# Patient Record
Sex: FEMALE | Race: BLACK OR AFRICAN AMERICAN | Employment: FULL TIME
[De-identification: names, ages, dates, MRNs, and addresses within clinical notes are randomized per-mention and may not be internally consistent; named-entity substitution may affect disease eponyms.]

---

## 2019-10-24 ENCOUNTER — NURSE TRIAGE (OUTPATIENT)
Dept: OTHER | Facility: CLINIC | Age: 34
End: 2019-10-24

## 2019-11-07 ENCOUNTER — NURSE TRIAGE (OUTPATIENT)
Dept: OTHER | Facility: CLINIC | Age: 34
End: 2019-11-07

## 2021-02-16 ENCOUNTER — OFFICE VISIT (OUTPATIENT)
Dept: ORTHOPEDIC SURGERY | Age: 36
End: 2021-02-16
Payer: COMMERCIAL

## 2021-02-16 VITALS — TEMPERATURE: 97.4 F | BODY MASS INDEX: 30.73 KG/M2 | HEIGHT: 64 IN | WEIGHT: 180 LBS

## 2021-02-16 DIAGNOSIS — S83.241A ACUTE MEDIAL MENISCUS TEAR, RIGHT, INITIAL ENCOUNTER: Primary | ICD-10-CM

## 2021-02-16 PROCEDURE — 99203 OFFICE O/P NEW LOW 30 MIN: CPT | Performed by: ORTHOPAEDIC SURGERY

## 2021-02-16 PROCEDURE — E0114 CRUTCH UNDERARM PAIR NO WOOD: HCPCS | Performed by: ORTHOPAEDIC SURGERY

## 2021-02-16 RX ORDER — NORELGESTROMIN AND ETHINYL ESTRADIOL 150; 35 UG/D; UG/D
PATCH TRANSDERMAL
COMMUNITY
Start: 2020-12-15

## 2021-02-16 RX ORDER — ESCITALOPRAM OXALATE 5 MG/1
TABLET ORAL
COMMUNITY
Start: 2020-12-08

## 2021-02-16 SDOH — ECONOMIC STABILITY: TRANSPORTATION INSECURITY
IN THE PAST 12 MONTHS, HAS LACK OF TRANSPORTATION KEPT YOU FROM MEETINGS, WORK, OR FROM GETTING THINGS NEEDED FOR DAILY LIVING?: NOT ASKED

## 2021-02-16 SDOH — ECONOMIC STABILITY: FOOD INSECURITY: WITHIN THE PAST 12 MONTHS, YOU WORRIED THAT YOUR FOOD WOULD RUN OUT BEFORE YOU GOT MONEY TO BUY MORE.: NOT ASKED

## 2021-02-16 NOTE — PROGRESS NOTES
ORTHOPAEDIC NEW PATIENT NOTE    Chief Complaint   Patient presents with    Knee Pain     Right       HPI   2/16/2021  39 y.o. 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 Pain Assessment  Location of Pain: Knee  Location Modifiers: Right  Severity of Pain: 10  Quality of Pain: Other (Comment)(Stabbing)  Duration of Pain: Persistent  Frequency of Pain: Intermittent  Date Pain First Started: 02/11/21  Aggravating Factors: Walking, Other (Comment), Bending(Bumping it)  Limiting Behavior: Yes  Relieving Factors: Rest, Ice, Nsaids  Result of Injury: No  Work-Related Injury: No  Are there other pain locations you wish to document?: No      Review of Systems  I have read over the ROS from the Patient History Form dated on 2/16/2021  Pertinent positives include - none listed  Rest of 13 point ROS otherwise negative except per HPI, and scanned into the patient's chart under the Media tab. No Known Allergies     Current Outpatient Medications   Medication Sig Dispense Refill    XULANE 150-35 MCG/24HR UNWRAP AND APPLY 1 PATCH TO SKIN ONCE A WEEK      ibuprofen (ADVIL;MOTRIN) 600 MG tablet Take 1 tablet by mouth every 6 hours as needed for Pain (Patient taking differently: Take 800 mg by mouth every 6 hours as needed for Pain ) 40 tablet 0    escitalopram (LEXAPRO) 5 MG tablet       hydrocortisone (ANUSOL-HC) 2.5 % rectal cream Place rectally 2 times daily.  (Patient not taking: Reported on 2/16/2021) 1 Tube 0  sennosides-docusate sodium (SENOKOT-S) 8.6-50 MG tablet Take 1 tablet by mouth daily (Patient not taking: Reported on 2021) 60 tablet 0    hydrocodone-acetaminophen (VICODIN) 5-500 MG per tablet Take 1 tablet by mouth every 6 hours as needed.  ferrous sulfate 325 (65 FE) MG tablet Take 325 mg by mouth daily (with breakfast).  Ascorbic Acid (VITAMIN C) 500 MG tablet Take 500 mg by mouth daily. No current facility-administered medications for this visit. No past medical history on file. Past Surgical History:   Procedure Laterality Date     SECTION         No family history on file. Social History     Socioeconomic History    Marital status:      Spouse name: Not on file    Number of children: Not on file    Years of education: Not on file    Highest education level: Not on file   Occupational History    Occupation: Pharmacy Tech     Employer: [employer]     Comment: [employer]   Social Needs    Financial resource strain: Not on file    Food insecurity     Worry: Not on file     Inability: Not on file   [facility] needs     Medical: Not on file     Non-medical: Not on file   Tobacco Use    Smoking status: Never Smoker    Smokeless tobacco: Never Used   Substance and Sexual Activity    Alcohol use:  Yes     Alcohol/week: 1.7 standard drinks     Types: 2 Standard drinks or equivalent per week    Drug use: No    Sexual activity: Not on file   Lifestyle    Physical activity     Days per week: Not on file     Minutes per session: Not on file    Stress: Not on file   Relationships    Social connections     Talks on phone: Not on file     Gets together: Not on file     Attends Sikh service: Not on file     Active member of club or organization: Not on file     Attends meetings of clubs or organizations: Not on file     Relationship status: Not on file    Intimate partner violence     Fear of current or ex partner: Not on file Emotionally abused: Not on file     Physically abused: Not on file     Forced sexual activity: Not on file   Other Topics Concern    Not on file   Social History Narrative    Not on file        Vitals:    02/16/21 1246   Temp: 97.4 °F (36.3 °C)   TempSrc: Infrared   Weight: 180 lb (81.6 kg)   Height: 5' 4\" (1.626 m)       Physical Exam  Constitutional  well-groomed, well-nourished, Body mass index is 30.9 kg/m². Psychiatric  pleasant, normal mood & affect  Cardiovascular  RRR, negative peripheral edema, no varicosities, dorsalis pedis pulse 2+  Skin  no rashes, wounds, or lesions seen on exposed skin  Neck/Spine - no radicular pain with SLR. Neurological - SILT SP/DP/T/sural/saphenous nerve distributions; EHL/FHL/TA/GS intact  Right knee:   valgus alignment    mild effusion noted   No obvious atrophy seen, but limited by body habitus    Severe tenderness to palpation medial joint line   No tenderness to palpation lateral joint line   Range of Motion:    Extension:  13    Flexion:  100   Special tests:    positive Quang exam     positive patellar grind   Ligamentous testing:    Varus stress stable    Valgus stress stable    Lachman stable    Posterior Drawer stable      Imaging:  Images were personally reviewed by myself and discussed with the patient  Right knee 4 views performed today in clinic   - Overall alignment is neutral.    The medial compartment is within normal limits with no evidence of subchondral cystic changes or osteophytes. The lateral compartment is within normal limits with no evidence of subchondral cystic changes or osteophytes. The anterior compartment is within normal limits with small osteophytes seen laterally. The patella is well-centered within the trochlear groove. There are no loose bodies appreciated. Assessment & Plan:  39 y.o. female who presents with    Diagnosis Orders   1.  Acute medial meniscus tear, right, initial encounter  XR KNEE RIGHT (MIN 4 VIEWS) Aluminum Crutches    MRI KNEE RIGHT WO CONTRAST           Procedures    Aluminum Crutches     Patient was prescribed Medline Aluminum Crutches. This mobility device is required for the following reasons:    Patient has mobility limitations that significantly impair their ability to participate in one or more mobility related activities of daily living. The patient is able to safely use the mobility device. Functional mobility deficit can be sufficiently resolved with the use of this device. Verbal and written instructions for the use of and application of this item were provided. The patient was educated and fit by a healthcare professional with expert knowledge and specialization in brace application while under the direct supervision of the treating physician. They were instructed to contact the office immediately should the equipment result in increased pain, decreased sensation, increased swelling or worsening of the condition. Concerned for acute MMT, likely displaced/flipped unstable fragment resulting in limited knee ROM, locked    I will order an MRI to evaluate. The patient is advised to return once completed so that we may review the images together and to discuss treatment options.     In the meantime, RLE protected WB with crutches  Ice, OTC NSAIDs PRN  Gentle ROM exercises at home      Stephanie Mcclellan

## 2021-02-16 NOTE — LETTER
[facility]  [address]  [address]  Phone: [phone]  Fax: [phone]    Sonia Lamb MD        February 16, 2021     Patient: Thao Gomes   YOB: 1985   Date of Visit: 2/16/2021       To Whom It May Concern: It is my medical opinion that Thao Gomes may return to light duty immediately with the following restrictions: May do sit down work only. Must use crutches when ambulating. This will be re-evaluated after MRI results are reviewed. If you have any questions or concerns, please don't hesitate to call.     Sincerely,          Sonia Lamb MD

## 2021-02-17 ENCOUNTER — TELEPHONE (OUTPATIENT)
Dept: ORTHOPEDIC SURGERY | Age: 36
End: 2021-02-17

## 2021-02-17 NOTE — TELEPHONE ENCOUNTER
Patient wants to know if the MRI was approved and also want to know did she leave some keys at the office. Please call to advise [phone].

## 2021-02-18 ENCOUNTER — HOSPITAL ENCOUNTER (OUTPATIENT)
Dept: MRI IMAGING | Age: 36
Discharge: HOME | End: 2021-02-18
Payer: COMMERCIAL

## 2021-02-18 DIAGNOSIS — S83.241A ACUTE MEDIAL MENISCUS TEAR, RIGHT, INITIAL ENCOUNTER: ICD-10-CM

## 2021-02-18 PROCEDURE — 73721 MRI JNT OF LWR EXTRE W/O DYE: CPT

## 2021-02-19 ENCOUNTER — OFFICE VISIT (OUTPATIENT)
Dept: ORTHOPEDIC SURGERY | Age: 36
End: 2021-02-19
Payer: COMMERCIAL

## 2021-02-19 VITALS — TEMPERATURE: 97.2 F | WEIGHT: 180 LBS | HEIGHT: 64 IN | BODY MASS INDEX: 30.73 KG/M2

## 2021-02-19 DIAGNOSIS — S86.811S: Primary | ICD-10-CM

## 2021-02-19 PROCEDURE — 99213 OFFICE O/P EST LOW 20 MIN: CPT | Performed by: ORTHOPAEDIC SURGERY

## 2021-02-19 RX ORDER — METHYLPREDNISOLONE 4 MG/1
TABLET ORAL
Qty: 1 KIT | Refills: 0 | Status: SHIPPED | OUTPATIENT
Start: 2021-02-19 | End: 2021-02-25

## 2021-02-19 NOTE — PROGRESS NOTES
ORTHOPAEDIC NEW PATIENT NOTE    Chief Complaint   Patient presents with    Follow-up     Right knee, MRI results. HPI   2021  FU right knee   Pain rated 7/10  Same location, anteromedial  Got MRI      2021  39 y.o. 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  Ascorbic Acid (VITAMIN C) 500 MG tablet Take 500 mg by mouth daily.  hydrocortisone (ANUSOL-HC) 2.5 % rectal cream Place rectally 2 times daily. (Patient not taking: Reported on 2021) 1 Tube 0    sennosides-docusate sodium (SENOKOT-S) 8.6-50 MG tablet Take 1 tablet by mouth daily (Patient not taking: Reported on 2021) 60 tablet 0    hydrocodone-acetaminophen (VICODIN) 5-500 MG per tablet Take 1 tablet by mouth every 6 hours as needed. No current facility-administered medications for this visit. No past medical history on file. Past Surgical History:   Procedure Laterality Date     SECTION         No family history on file.     Social History     Socioeconomic History    Marital status:      Spouse name: Not on file    Number of children: Not on file    Years of education: Not on file    Highest education level: Not on file   Occupational History    Occupation: Pharmacy Tech     Employer: [employer]     Comment: [employer]   Social Needs    Financial resource strain: Not on file    Food insecurity     Worry: Not on file     Inability: Not on file   [employer] needs     Medical: Not on file     Non-medical: Not on file   Tobacco Use    Smoking status: Never Smoker    Smokeless tobacco: Never Used   Substance and Sexual Activity    Alcohol use: Yes     Alcohol/week: 1.7 standard drinks     Types: 2 Standard drinks or equivalent per week    Drug use: No    Sexual activity: Not on file   Lifestyle    Physical activity     Days per week: Not on file     Minutes per session: Not on file    Stress: Not on file   Relationships    Social connections     Talks on phone: Not on file     Gets together: Not on file     Attends Zoroastrian service: Not on file     Active member of club or organization: Not on file     Attends meetings of clubs or organizations: Not on file     Relationship status: Not on file    Intimate partner violence     Fear of current or ex partner: Not on file     Emotionally abused: Not on file     Physically abused: Not on file     Forced sexual activity: Not on file   Other Topics Concern    Not on file   Social History Narrative    Not on file        Vitals:    02/19/21 0845   Temp: 97.2 °F (36.2 °C)   TempSrc: Infrared   Weight: 180 lb (81.6 kg)   Height: 5' 4\" (1.626 m)       Physical Exam  Constitutional  well-groomed, well-nourished, Body mass index is 30.9 kg/m².   Psychiatric  pleasant, normal mood & affect  Neurological - SILT SP/DP/T/sural/saphenous nerve distributions; EHL/FHL/TA/GS intact  Right knee:   valgus alignment    mild effusion noted   No obvious atrophy seen, but limited by body habitus    Severe tenderness to palpation medial joint line   No tenderness to palpation lateral joint line   Range of Motion:    Extension:  13    Flexion:  100   Special tests:    positive Quang exam     positive patellar grind    Negative patellar apprehension   Ligamentous testing:    Varus stress stable    Valgus stress stable    Lachman stable    Posterior Drawer stable      Imaging:  Images were personally reviewed by myself and discussed with the patient  Right knee 4 views performed previously   - Overall alignment is neutral.    The medial compartment is within normal limits with no evidence of subchondral cystic changes or osteophytes. The lateral compartment is within normal limits with no evidence of subchondral cystic changes or osteophytes. The anterior compartment is within normal limits with small osteophytes seen laterally. The patella is well-centered within the trochlear groove. There are no loose bodies appreciated.     Narrative   EXAMINATION:   MRI OF THE RIGHT KNEE WITHOUT CONTRAST, 2/18/2021 8:56 am       TECHNIQUE:   Multiplanar multisequence MRI of the right knee was performed without the   administration of intravenous contrast.       COMPARISON:   None.       HISTORY:   ORDERING SYSTEM PROVIDED HISTORY: Acute medial meniscus tear, right, initial   encounter   TECHNOLOGIST PROVIDED HISTORY:   Emma Omayra   Is the patient pregnant?->No   Reason for Exam: NO KNOWN INJURY TO RIGHT KNEE   Acuity: Acute   Type of Exam: Initial   Additional signs and symptoms: NO KNOWN INJURY TO RIGHT KNEE   Relevant Medical/Surgical History: NO KNOWN INJURY TO RIGHT KNEE       FINDINGS:   MENISCI:  The medial and lateral meniscus are normal in position, morphology   and signal.       CRUCIATE LIGAMENTS: The anterior and posterior cruciate ligaments are normal   in signal, morphology and course.       EXTENSOR MECHANISM: Quadriceps and patellar tendons are intact.  Attenuated   appearance to the medial patellofemoral retinaculum,

## 2021-07-01 ENCOUNTER — OFFICE VISIT (OUTPATIENT)
Dept: ORTHOPEDIC SURGERY | Age: 36
End: 2021-07-01
Payer: COMMERCIAL

## 2021-07-01 VITALS — HEIGHT: 64 IN | RESPIRATION RATE: 14 BRPM | WEIGHT: 190 LBS | BODY MASS INDEX: 32.44 KG/M2

## 2021-07-01 DIAGNOSIS — M25.561 ACUTE PAIN OF RIGHT KNEE: Primary | ICD-10-CM

## 2021-07-01 DIAGNOSIS — S86.811S: ICD-10-CM

## 2021-07-01 PROCEDURE — 99213 OFFICE O/P EST LOW 20 MIN: CPT | Performed by: PHYSICIAN ASSISTANT

## 2021-07-01 RX ORDER — METHYLPREDNISOLONE 4 MG/1
TABLET ORAL
Qty: 1 KIT | Refills: 0 | Status: SHIPPED | OUTPATIENT
Start: 2021-07-01 | End: 2021-07-30 | Stop reason: ALTCHOICE

## 2021-07-01 NOTE — PROGRESS NOTES
Subjective:      Patient ID: Gil Garduno is a 39 y.o. female. Chief Complaint   Patient presents with    Knee Pain     right knee pain         HPI:   She is here in the [address] [place] [address]   for an evaluation of right knee pain. She has been seen for the same complaint by Dr Annia Em and diagnosed with traumatic medial retinacular tear on MRI. She was referred to physical therapy in February for the same complaint. She states she did not go to therapy due to financial expense. She has been treating herself with anti-inflammatory medications and performing a home exercise program.  Over the last 1 to 2 weeks her symptoms have significantly worsened. She denies any new traumatic event. Pain Scale at times as severe as 8/10 VAS. Location of pain anterior medial  right knee. Pain is worse with stairs and chairs. Pain improves with elevation. Previous treatments have included OTC ibuprofen. Review Of Systems:       As outlined in the HPI. Negative for fever or chills. Negative for poly- joint pain, swelling and stiffness. Negative for numbness or tingling. History reviewed. No pertinent past medical history. History reviewed. No pertinent family history. Past Surgical History:   Procedure Laterality Date     SECTION         Social History     Occupational History    Occupation: Pharmacy Tech     Employer: [employer]     Comment: [employer]   Tobacco Use    Smoking status: Never Smoker    Smokeless tobacco: Never Used   Substance and Sexual Activity    Alcohol use: Yes     Alcohol/week: 1.7 standard drinks     Types: 2 Standard drinks or equivalent per week    Drug use: No    Sexual activity: Not on file       Current Outpatient Medications   Medication Sig Dispense Refill    methylPREDNISolone (MEDROL, JESSIKA,) 4 MG tablet Take by mouth.  6 po day one 5 po day 2 4 po day 3 3 po day 4 2 po day 5 1 po day 6. 1 kit 0    XULANE 150-35 MCG/24HR UNWRAP Stress negative for pain or crepitus. Valgus Stress negative for pain or crepitus. Lachman's test negative for  ACL laxity. Anterior Drawer test negative for ACL laxity. Posterior Drawer test negative for PCL laxity. Quang's Test negative for meniscus tear. Patellar Compression testing positive for pain or crepitus. Extensor Mechanism is intact. Examination of the lower extremities are intact with sensation to light touch. Motor testing  5/5 in all major motor groups of the lower extremities. Negative Kong's Sign. SLR negative. Examination of the lower extremities shows intact perfusion to all extremities. No cyanosis. Digits are warm to touch, capillary refill is less than 2 seconds. There is no edema noted. Examination of the skin over both lower extremities reveals: The skin to be intact without lacerations or abrasions. No significant erythema. No significant rashes or skin lesions. X Rays: performed in the office today:   AP Standing,Lateral and Sunrise of right Knee: Normal radiographic study. The alignment is anatomic. There are no radiographic findings to suggest fracture or dislocation. There is no patellar subluxation. Additional Tests reviewed:   Impression   No meniscal, cruciate, or collateral ligamentous tear.  No significant   chondromalacia.  Small patellofemoral joint effusion.       Attenuated appearance of the medial patellofemoral retinaculum at its femoral   attachment, possibly chronically torn.  TT TG interval measures 1.5 cm. Additional Outside Records reviewed: Dr. Clau Booth office visit notes. Diagnosis:       ICD-10-CM    1. Acute pain of right knee  M25.561 XR KNEE RIGHT (3 VIEWS)   2.  Traumatic medial retinacular tear of knee, right, sequela  S86.811S Ambulatory referral to Physical Therapy        Assessment and Plan:       Assessment:  Right anterior medial knee pain felt to be related to her same injury of a traumatic medial

## 2021-07-10 ENCOUNTER — APPOINTMENT (OUTPATIENT)
Dept: PHYSICAL THERAPY | Age: 36
End: 2021-07-10
Payer: COMMERCIAL

## 2021-07-14 ENCOUNTER — HOSPITAL ENCOUNTER (OUTPATIENT)
Dept: PHYSICAL THERAPY | Age: 36
Setting detail: THERAPIES SERIES
Discharge: HOME | End: 2021-07-14
Payer: COMMERCIAL

## 2021-07-14 PROCEDURE — 97161 PT EVAL LOW COMPLEX 20 MIN: CPT

## 2021-07-14 PROCEDURE — 97110 THERAPEUTIC EXERCISES: CPT

## 2021-07-14 NOTE — PROGRESS NOTES
[facility]  [address] [place] [address] Drive  Phone: [phone]   Fax:     [phone]                                                       Physical Therapy Certification    Dear Referring Practitioner: AMANDA Sanon,    We had the pleasure of evaluating the following patient for physical therapy services at [address] [place] [address]. A summary of our findings can be found in the initial assessment below. This includes our plan of care. If you have any questions or concerns regarding these findings, please do not hesitate to contact me at the office phone number checked above. Thank you for the referral.       Physician Signature:_______________________________Date:__________________  By signing above (or electronic signature), therapists plan is approved by physician      Patient: Laurie Suarez   : 1985   MRN: [id number]  Referring Physician: Referring Practitioner: AMANDA Sanon      Evaluation Date: 2021      Medical Diagnosis Information:  Diagnosis: H28.763V (ICD-10-CM) - Traumatic medial retinacular tear of knee, right, sequela   Treatment Diagnosis: LE weakness and edema, decreased gait                                         Insurance information: PT Insurance Information: medical mutual    Precautions/ Contra-indications:   Latex Allergy:  [x]NO      []YES  Preferred Language for Healthcare:   [x]English       []Other:    C-SSRS Triggered by Intake questionnaire (Past 2 wk assessment ):   [x] No, Questionnaire did not trigger screening.   [] Yes, Patient intake triggered C-SSRS Screening     [] Completed, no further action required. [] Completed, PCP notified via Epic    SUBJECTIVE: Patient stated complaint: stated nothing happened. Was sitting on the the couch with the knee crossed under the other knee and couldn't stand after that. Pt reports pain most of the time. Taking a steroid now for the knee.   Ibuprofen is not working as well . Couldn't bend it when it rained hard.       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     Living Status/Prior Level of Function: Prior to this injury / incident, pt was independent with ADLs and IADLs, I with all ADL's    OBJECTIVE:     Palpation: tenderness at medial knee     Functional Mobility/Transfers: difficulty at times with turning fast, difficulty with going down steps, difficultly with getting leg out of car or sitting for a long time    Inspection: slight edema    Posture: good    Bandages/Dressings/Incisions: NA    Gait: (include devices/WB status): decreased weight  Bearing     Dermatomes Normal Abnormal Comments   Top of head (C1) x     Posterior occipital region (C2) x     Side of neck (C3) x     Top of shoulder (C4) x     Lateral deltoid (C5) x     Tip of thumb (C6) x     Distal middle finger (C7) x     Distal fifth finger (C8) x     Medial forearm (T1) x     inguinal area (L1)  x     anterior mid-thigh (L2) x     distal ant thigh/med knee (L3) x     medial lower leg and foot (L4) x     lateral lower leg and foot (L5) x     posterior calf (S1) x     medial calcaneus (S2) x         Myotomes Normal Abnormal Comments   Neck flexion (C1-C2)      Neck sidebending (C3)      Shoulder elevation (C4)      Shoulder abduction (C5)      Elbow flexion/wrist extension (C6)      Elbow extension/wrist flexion (C7)      Thumb abduction (C8)      Finger abduction (T1)      Hip flexion (L1-L2) x     Knee extension (L2-L4) x     Dorsiflexion (L4-L5) x     Great Toe Ext (L5) x     Ankle Eversion (S1-S2) x     Ankle PF(S1-S2) x              PROM AROM    L R L R                               Knee flex 127 degree   Knee ext     0 degrees                                   Joint mobility:    [x]Normal    []Hypo   []Hyper    Strength (0-5) Left Right    hip flex  4/5   Hip ext  4//5   Hip abd  4+/5   hip add  3+/5   Knee flex  3+/5   Knee ext   3+/5                                           Flexibility     hamstring WNL                        Girth (cm)                                                   [x] Patient history, allergies, meds reviewed. Medical chart reviewed. See intake form. Review Of Systems (ROS):  [x]Performed Review of systems (Integumentary, CardioPulmonary, Neurological) by intake and observation. Intake form has been scanned into medical record. Patient has been instructed to contact their primary care physician regarding ROS issues if not already being addressed at this time.       Co-morbidities/Complexities (which will affect course of rehabilitation):   []None        []Hx of COVID   Arthritic conditions   []Rheumatoid arthritis (M05.9)  []Osteoarthritis (M19.91)  []Gout   Cardiovascular conditions   []Hypertension (I10)  []Hyperlipidemia (E78.5)  []Angina pectoris (I20)  []Atherosclerosis (I70)  []Pacemaker  []Hx of CABG/stent/  cardiac surgeries   Musculoskeletal conditions   []Disc pathology   []Congenital spine pathologies   []Osteoporosis (M81.8)  []Osteopenia (M85.8)  []Scoliosis       Endocrine conditions   []Hypothyroid (E03.9)  []Hyperthyroid Gastrointestinal conditions   []Constipation (K23.33)   Metabolic conditions   []Morbid obesity (E66.01)  []Diabetes type 1(E10.65) or 2 (E11.65)   []Neuropathy (G60.9)     Cardio/Pulmonary conditions   []Asthma (J45)  []Coughing   []COPD (J44.9)  []CHF  []A-fib   Psychological Disorders  []Anxiety (F41.9)  []Depression (F32.9)   []Other:   Developmental Disorders  []Autism (F84.0)  []CP (G80)  []Down Syndrome (Q90.9)  []Developmental delay     Neurological conditions  []Prior Stroke (I69.30)  []Parkinson's (G20)  []Encephalopathy (G93.40)  []MS (G35)  []Post-polio (G14)  []SCI  []TBI  []ALS Other conditions  []Fibromyalgia (M79.7)  []Vertigo  []Syncope  []Kidney Failure  []Cancer      []currently undergoing                treatment  []Pregnancy  []Incontinence   Prior surgeries  []involved limb  []previous spinal surgery  [] section birth  []hysterectomy  []bowel / bladder surgery  []other relevant surgeries   []Other:              Barriers to/and or personal factors that will affect rehab potential:              []Age  []Sex   []Smoker              []Motivation/Lack of Motivation                        []Co-Morbidities              []Cognitive Function, education/learning barriers              []Environmental, home barriers              []profession/work barriers  []past PT/medical experience  []other:  Justification:     Falls Risk Assessment (30 days):   [x] Falls Risk assessed and no intervention required. [] Falls Risk assessed and Patient requires intervention due to being higher risk   TUG score (>12s at risk):     [] Falls education provided, including         ASSESSMENT: Pt present with torn medial knee retinaculum causing knee weakness, gait deviation and pain. Pt will benefit from skilled physical therapy to return to PLOF.    Functional Impairments:     []Noted  joint hypomobility   []Noted  joint hypermobility   [x]Decreased functional ROM   []Abnormal reflexes/sensation/myotomal/dermatomal deficits   [x]Decreased strength and neuromuscular control    [x]Decreased functional strength   []other:      Functional Activity Limitations (from functional questionnaire and intake)   [x]Reduced ability to tolerate prolonged functional positions   [x]Reduced ability or difficulty with changes of positions or transfers between positions   []Reduced ability to maintain good posture and demonstrate good body mechanics with sitting, bending, and lifting   [] Reduced ability or tolerance with driving and/or computer work   [x]Reduced face-to-face)  [] RE-EVAL     PLAN:   Frequency/Duration:  2 days per week for 6 Weeks:  Interventions:  [x]  Therapeutic exercise including: strength training, ROM, including postural re-education. [x]  NMR activation and proprioception, including postural re-education. [x]  Manual therapy as indicated to include: Dry Needling/IASTM, STM, PROM, Gr I-IV mobilizations, manipulation. [x] Modalities as needed that may include: thermal agents, E-stim, Biofeedback, US, iontophoresis as indicated  [x] Patient education on joint protection, postural re-education, activity modification, progression of HEP. HEP instruction: Written HEP instructions provided and reviewed  Access Code: [email or web address]  URL: [email or web address]  Date: 07/14/2021  Prepared by: Nichelle Ventura    Exercises  Straight Leg Raise - 1 x daily - 7 x weekly - 1 sets - 10 reps  Sidelying Hip Abduction - 1 x daily - 7 x weekly - 1 sets - 10 reps  Prone Hip Extension - 1 x daily - 7 x weekly - 1 sets - 10 reps  Mini Squat - 1 x daily - 7 x weekly - 1 sets - 10 reps  Supine Quad Set - 1 x daily - 7 x weekly - 1 sets - 10 reps - 5 sec hold      GOALS:  Patient stated goal: return to using the knee regularly without pain    Therapist goals for Patient:   Short Term Goals: To be achieved in: 2 weeks  1. Independent in HEP and progression per patient tolerance, in order to prevent re-injury. 2. Patient will have a decrease in pain to facilitate improvement in movement, function, and ADLs as indicated by Functional Deficits. Long Term Goals: To be achieved in: 6 weeks  1. Disability index score of 20% or less for the NDI to assist with reaching prior level of function. 2. Patient will demonstrate increased AROM to [facility] to allow for proper joint functioning as indicated by patients Functional Deficits.    3. Patient will demonstrate an increase in postural awareness and control to allow for proper functional mobility as indicated by patients Functional Deficits. 4. Patient will demonstrate an increase in Strength to at least  to allow for proper functional mobility as indicated by patients Functional Deficits. 5. Patient will return to functional activities including getting out of the car easily without increased symptoms or restriction.         Electronically signed by:  Soco Hopkins PT

## 2021-07-14 NOTE — FLOWSHEET NOTE
[address] [facility]  Phone: [phone]   Fax: [phone]    Physical Therapy Daily Treatment Note  Date:  2021    Patient Name:  Mily Hagan    :  1985  MRN: [id number]  Medical/Treatment Diagnosis Information:  · Diagnosis: W82.945Z (ICD-10-CM) - Traumatic medial retinacular tear of knee, right, sequela  · Treatment Diagnosis: LE weakness and edema, decreased gait  Insurance/Certification information:  PT Insurance Information: medical mutual  Physician Information:  Referring Practitioner: AMANDA Ko  Plan of care signed (Y/N): []  Yes [x]  No     Date of Patient follow up with Physician:      Progress Report: []  Yes  [x]  No     Date Range for reporting period:  Beginnin21  Ending:     Progress report due (10 Rx/or 30 days whichever is less): visit #10 or 8/10/67     Recertification due (POC duration/ or 90 days whichever is less): visit #12 or 10/14/21     Visit # Insurance Allowable Auth required?  Date Range     []  Yes  []  No        Latex Allergy:  [x]NO      []YES  Preferred Language for Healthcare:   [x]English       []other:    Functional Scale:        Date assessed:  LEFS: raw score = 20/80; dysfunction = 60-79%  21    Pain level:  7/10     SUBJECTIVE:  See eval    OBJECTIVE: 21: ITB irritation due to limping       RESTRICTIONS/PRECAUTIONS:     Exercises/Interventions:     Therapeutic Exercises (24150) Resistance / level Sets/sec Reps Notes   bike       HS/HF stretch        quad strengthening                                                 Therapeutic Activities (50548)                                          Neuromuscular Re-ed (26506)                                                 Manual Intervention ([id number])       ITB \"the stick\"                                              Modalities:     Pt. Education:  -patient educated on diagnosis, prognosis and expectations for rehab  -all patient questions were answered    Home Exercise Program:  Access Code: [email or web address]  URL: [email or web address]  Date: 07/14/2021  Prepared by: Yosi Overcast     Exercises  Straight Leg Raise - 1 x daily - 7 x weekly - 1 sets - 10 reps  Sidelying Hip Abduction - 1 x daily - 7 x weekly - 1 sets - 10 reps  Prone Hip Extension - 1 x daily - 7 x weekly - 1 sets - 10 reps  Mini Squat - 1 x daily - 7 x weekly - 1 sets - 10 reps  Supine Quad Set - 1 x daily - 7 x weekly - 1 sets - 10 reps - 5 sec hold         Therapeutic Exercise and NMR EXR  [x] (45398) Provided verbal/tactile cueing for activities related to strengthening, flexibility, endurance, ROM for improvements in  [x] LE / Lumbar: LE, proximal hip, and core control with self care, mobility, lifting, ambulation. [] UE / Cervical: cervical, postural, scapular, scapulothoracic and UE control with self care, reaching, carrying, lifting, house/yardwork, driving, computer work.  [] (91556) Provided verbal/tactile cueing for activities related to improving balance, coordination, kinesthetic sense, posture, motor skill, proprioception to assist with   [] LE / lumbar: LE, proximal hip, and core control in self care, mobility, lifting, ambulation and eccentric single leg control. [] UE / cervical: cervical, scapular, scapulothoracic and UE control with self care, reaching, carrying, lifting, house/yardwork, driving, computer work.   [] (59332) Therapist is in constant attendance of 2 or more patients providing skilled therapy interventions, but not providing any significant amount of measurable one-on-one time to either patient, for improvements in  [] LE / lumbar: LE, proximal hip, and core control in self care, mobility, lifting, ambulation and eccentric single leg control. [] UE / cervical: cervical, scapular, scapulothoracic and UE control with self care, reaching, carrying, lifting, house/yardwork, driving, computer work.      NMR and Therapeutic Activities:    [x] (20647 or 52875) Provided verbal/tactile cueing for activities related to improving balance, coordination, kinesthetic sense, posture, motor skill, proprioception and motor activation to allow for proper function of   [x] LE: / Lumbar core, proximal hip and LE with self care and ADLs  [] UE / Cervical: cervical, postural, scapular, scapulothoracic and UE control with self care, carrying, lifting, driving, computer work.   [] (16398) Gait Re-education- Provided training and instruction to the patient for proper LE, core and proximal hip recruitment and positioning and eccentric body weight control with ambulation re-education including up and down stairs     Home Management Training / Self Care:  [x] (49811) Provided self-care/home management training related to activities of daily living and compensatory training, and/or use of adaptive equipment for improvement with: ADLs and compensatory training, meal preparation, safety procedures and instruction in use of adaptive equipment, including bathing, grooming, dressing, personal hygiene, basic household cleaning and chores.      Home Exercise Program:    [x] (01735) Reviewed/Progressed HEP activities related to strengthening, flexibility, endurance, ROM of   [x] LE / Lumbar: core, proximal hip and LE for functional self-care, mobility, lifting and ambulation/stair navigation   [] UE / Cervical: cervical, postural, scapular, scapulothoracic and UE control with self care, reaching, carrying, lifting, house/yardwork, driving, computer work  [] (36603)Reviewed/Progressed HEP activities related to improving balance, coordination, kinesthetic sense, posture, motor skill, proprioception of   [] LE: core, proximal hip and LE for self care, mobility, lifting, and ambulation/stair navigation    [] UE / Cervical: cervical, postural,  scapular, scapulothoracic and UE control with self care, reaching, carrying, lifting, house/yardwork, driving, computer work    Manual Treatments:  PROM / STM / Oscillations-Mobs:  G-I, II, III, IV (PA's, Inf., Post.)  [] (17553) Provided manual therapy to mobilize LE, proximal hip and/or LS spine soft tissue/joints for the purpose of modulating pain, promoting relaxation,  increasing ROM, reducing/eliminating soft tissue swelling/inflammation/restriction, improving soft tissue extensibility and allowing for proper ROM for normal function with   [] LE / lumbar: self care, mobility, lifting and ambulation. [] UE / Cervical: self care, reaching, carrying, lifting, house/yardwork, driving, computer work. Modalities:  [] (36781) Vasopneumatic compression: Utilized vasopneumatic compression to decrease edema / swelling for the purpose of improving mobility and quad tone / recruitment which will allow for increased overall function including but not limited to self-care, transfers, ambulation, and ascending / descending stairs. Charges:  Timed Code Treatment Minutes: 15   Total Treatment Minutes: 40     [x] EVAL - LOW (30907)   [] EVAL - MOD (46228)  [] EVAL - HIGH (79359)  [] RE-EVAL (74489)  [x] XI(28864) x       [] Ionto  [] NMR (07668) x       [] Vaso  [] Manual (46826) x       [] Ultrasound  [] TA x        [] Mech Traction (79347)  [] Aquatic Therapy x     [] ES (un) (88478):   [] Home Management Training x  [] ES(attended) (64907)   [] Dry Needling 1-2 muscles (71598):  [] Dry Needling 3+ muscles (455474)  [] Group:      [] Other:     GOALS:   Patient stated goal: return to using the knee regularly without pain     Therapist goals for Patient:   Short Term Goals: To be achieved in: 2 weeks  1. Independent in HEP and progression per patient tolerance, in order to prevent re-injury. 2. Patient will have a decrease in pain to facilitate improvement in movement, function, and ADLs as indicated by Functional Deficits.     Long Term Goals: To be achieved in: 6 weeks  1. Disability index score of 20% or less for the NDI to assist with reaching prior level of function. 2. Patient will demonstrate increased AROM to [facility] to allow for proper joint functioning as indicated by patients Functional Deficits. 3. Patient will demonstrate an increase in postural awareness and control to allow for proper functional mobility as indicated by patients Functional Deficits. 4. Patient will demonstrate an increase in Strength to at least  to allow for proper functional mobility as indicated by patients Functional Deficits. 5. Patient will return to functional activities including getting out of the car easily without increased symptoms or restriction. Overall Progression Towards Functional goals/ Treatment Progress Update:  [] Patient is progressing as expected towards functional goals listed. [] Progression is slowed due to complexities/Impairments listed. [] Progression has been slowed due to co-morbidities. [x] Plan just implemented, too soon to assess goals progression <30days   [] Goals require adjustment due to lack of progress  [] Patient is not progressing as expected and requires additional follow up with physician  [] Other    Persisting Functional Limitations/Impairments:  []Sleeping [x]Sitting               [x]Standing [x]Transfers        [x]Walking [x]Kneeling               [x]Stairs [x]Squatting / bending   [x]ADLs []Reaching  [x]Lifting  []Housework  []Driving [x]Job related tasks  []Sports/Recreation []Other:        ASSESSMENT:  See eval  Treatment/Activity Tolerance:  [x] Patient able to complete tx [] Patient limited by fatigue  [] Patient limited by pain  [] Patient limited by other medical complications  [] Other:     Prognosis: [x] Good [] Fair  [] Poor    Patient Requires Follow-up: [x] Yes  [] No    Plan for next treatment session:    PLAN: See eval. PT 2x / week for 6 weeks.    [] Continue per plan of care [] Alter current plan (see comments)  [x] Plan of care initiated [] Hold pending MD visit [] Discharge    Electronically signed by: Roverto Gonzalez Renee, PT PT, DPT    Note: If patient does not return for scheduled/ recommended follow up visits, this note will serve as a discharge from care along with most recent update on progress.

## 2021-07-28 NOTE — TELEPHONE ENCOUNTER
Called and informed patient that we are not able to refill and she needs to see Dr Rajani Snyder for follow up.

## 2021-07-30 ENCOUNTER — OFFICE VISIT (OUTPATIENT)
Dept: ORTHOPEDIC SURGERY | Age: 36
End: 2021-07-30
Payer: COMMERCIAL

## 2021-07-30 VITALS — BODY MASS INDEX: 32.61 KG/M2 | WEIGHT: 191 LBS | HEIGHT: 64 IN | RESPIRATION RATE: 14 BRPM

## 2021-07-30 DIAGNOSIS — M25.561 PATELLOFEMORAL ARTHRALGIA OF RIGHT KNEE: Primary | ICD-10-CM

## 2021-07-30 PROCEDURE — 99213 OFFICE O/P EST LOW 20 MIN: CPT | Performed by: ORTHOPAEDIC SURGERY

## 2021-07-30 NOTE — PROGRESS NOTES
ORTHOPAEDIC NEW PATIENT NOTE    Chief Complaint   Patient presents with    Knee Pain     CK RT Knee        HPI   7/30/21  Daxa Randolph returns to clinic today for her right knee  She reports persistent anteromedial knee pain  Worse recently  There is no new injury or trauma  Went to after-hours clinic and saw Cynthia Britt  He reinforced to her the importance of physical therapy, she has been to 1 session so far  He also put her on a steroid Dosepak      2/19/2021  FU right knee   Pain rated 7/10  Same location, anteromedial  Got MRI      2/16/2021  39 y.o. 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 1 Tube 0    ibuprofen (ADVIL;MOTRIN) 600 MG tablet Take 1 tablet by mouth every 6 hours as needed for Pain (Patient taking differently: Take 800 mg by mouth every 6 hours as needed for Pain ) 40 tablet 0    sennosides-docusate sodium (SENOKOT-S) 8.6-50 MG tablet Take 1 tablet by mouth daily 60 tablet 0    hydrocodone-acetaminophen (VICODIN) 5-500 MG per tablet Take 1 tablet by mouth every 6 hours as needed.  ferrous sulfate 325 (65 FE) MG tablet Take 325 mg by mouth daily (with breakfast).  Ascorbic Acid (VITAMIN C) 500 MG tablet Take 500 mg by mouth daily. No current facility-administered medications for this visit. No past medical history on file.      Past Surgical History:   Procedure Laterality Date     SECTION         No family history on file. Social History     Socioeconomic History    Marital status:      Spouse name: Not on file    Number of children: Not on file    Years of education: Not on file    Highest education level: Not on file   Occupational History    Occupation: Pharmacy Tech     Employer: [employer]     Comment: [employer]   Tobacco Use    Smoking status: Never Smoker    Smokeless tobacco: Never Used   Substance and Sexual Activity    Alcohol use: Yes     Alcohol/week: 1.7 standard drinks     Types: 2 Standard drinks or equivalent per week    Drug use: No    Sexual activity: Not on file   Other Topics Concern    Not on file   Social History Narrative    Not on file     Social Determinants of Health     Financial Resource Strain:     Difficulty of Paying Living Expenses:    Food Insecurity:     Worried About Running Out of Food in the Last Year:     [address] in the Last Year:    Transportation Needs:     Lack of Transportation (Medical):  Lack of Transportation (Non-Medical):    Physical Activity:     Days of Exercise per Week:     Minutes of Exercise per Session:    Stress:     Feeling of Stress :    Social Connections:     Frequency of Communication with Friends and Family:     Frequency of Social Gatherings with Friends and Family:     Attends Protestant Services:     Active Member of Clubs or Organizations:     Attends Club or Organization Meetings:     Marital Status:    Intimate Partner Violence:     Fear of Current or Ex-Partner:     Emotionally Abused:     Physically Abused:     Sexually Abused:         Vitals:    21 0758   Resp: 14   Weight: 191 lb (86.6 kg)   Height: 5' 4\" (1.626 m)       Physical Exam  Constitutional  well-groomed, well-nourished, Body mass index is 32.79 kg/m².   Psychiatric  pleasant, normal mood & affect  Neurological - SILT SP/DP/T/sural/saphenous nerve distributions; EHL/FHL/TA/GS intact  Right knee:   Clinical valgus alignment    mild effusion noted   No obvious atrophy seen, but limited by body habitus    Mild tenderness to palpation medial joint line   No tenderness to palpation lateral joint line   Range of Motion:    Extension:  0    Flexion:  120   Special tests:    positive patellar grind test    Negative patellar apprehension   Ligamentous testing:    Varus stress stable    Valgus stress stable    Lachman stable    Posterior Drawer stable      Imaging:  None today  Right knee radiographs performed previously   - Overall alignment is neutral.    The medial compartment is within normal limits with no evidence of subchondral cystic changes or osteophytes. The lateral compartment is within normal limits with no evidence of subchondral cystic changes or osteophytes. The anterior compartment is within normal limits with small osteophytes seen laterally. There is mild to moderate lateral patellar tilt. There are no loose bodies appreciated.     Narrative   EXAMINATION:   MRI OF THE RIGHT KNEE WITHOUT CONTRAST, 2/18/2021 8:56 am       TECHNIQUE:   Multiplanar multisequence MRI of the right knee was performed without the   administration of intravenous contrast.       COMPARISON:   None.       HISTORY:   ORDERING SYSTEM PROVIDED HISTORY: Acute medial meniscus tear, right, initial   encounter   TECHNOLOGIST PROVIDED HISTORY:   [facility]   Is the patient pregnant?->No   Reason for Exam: NO KNOWN INJURY TO RIGHT KNEE   Acuity: Acute   Type of Exam: Initial   Additional signs and symptoms: NO KNOWN INJURY TO RIGHT KNEE   Relevant Medical/Surgical History: NO KNOWN INJURY TO RIGHT KNEE       FINDINGS:   MENISCI:  The medial and lateral meniscus are normal in position, morphology   and signal.       CRUCIATE LIGAMENTS: The anterior and posterior cruciate ligaments are normal   in signal, morphology and course.       EXTENSOR MECHANISM: Quadriceps and patellar tendons are intact.  Attenuated   appearance to the medial patellofemoral retinaculum, possibly chronically   torn.  Lateral retinaculum is intact.       LATERAL COLLATERAL LIGAMENT COMPLEX: The popliteus tendon, biceps femoris   tendon, fibular collateral ligament and iliotibial band are intact.       MEDIAL COLLATERAL LIGAMENT COMPLEX: The superficial and deep components of   the medial collateral ligament are intact.       KNEE JOINT: Articular cartilage is preserved within the medial, lateral, and   patellofemoral compartments.  Small patellofemoral joint effusion.  TT TG   interval measures 1.5 cm.       BONE MARROW: No discrete marrow signal abnormality.           Impression   No meniscal, cruciate, or collateral ligamentous tear.  No significant   chondromalacia.  Small patellofemoral joint effusion.       Attenuated appearance of the medial patellofemoral retinaculum at its femoral   attachment, possibly chronically torn.  TT TG interval measures 1.5 cm.           Assessment & Plan:  39 y.o. female who presents with    Diagnosis Orders   1. Patellofemoral arthralgia/maltracking of right knee  RI SYNVISC OR SYNVISC-ONE           Procedures    RI SYNVISC OR SYNVISC-ONE       Prior MRI reviewed  No history of patellar instability  Negative patellar apprehension on exam    Symptoms are more consistent with patellofemoral arthralgia/chondromalacia/maltracking    Biomechanics of patellofemoral compartment discussed, including relation to body weight (6x body weight), and importance of lower extremity positioning and strengthening, particularly of the VMO and hip external rotators. Core and lumbar exercises are important, as are hamstring stretches also encouraged. These exercises aid in patellar tracking and can reduce stresses on the patellofemoral compartment.   Formal PT with transition to home program - low impact exercises, closed chain knee/quadriceps exercises, core/lumbar/hip ER strengthening as well.  Ice, NSAIDs, activity modification  For those who are overweight or obese, weight loss stressed. Informed the patient this is a lifelong process, there is no quick solution. Maintaining HEP is extremely important to prevent future recurrence of symptoms, or at the very least, to minimize the frequency and severity of symptoms.     She has had a couple rounds of oral steroids already  I do not recommend long-term steroids for this issue  We will submit to her insurance for viscosupplementation approval    Sondra Donahue MD

## 2021-07-31 ENCOUNTER — HOSPITAL ENCOUNTER (OUTPATIENT)
Dept: PHYSICAL THERAPY | Age: 36
Setting detail: THERAPIES SERIES
Discharge: HOME | End: 2021-07-31
Payer: COMMERCIAL

## 2021-07-31 PROCEDURE — 97530 THERAPEUTIC ACTIVITIES: CPT

## 2021-07-31 PROCEDURE — 97110 THERAPEUTIC EXERCISES: CPT

## 2021-07-31 PROCEDURE — 97035 APP MDLTY 1+ULTRASOUND EA 15: CPT

## 2021-07-31 NOTE — FLOWSHEET NOTE
[facility]  Phone: [phone]   Fax: [phone]    Physical Therapy Daily Treatment Note  Date:  2021    Patient Name:  Nomi Varner    :  1985  MRN: [id number]  Medical/Treatment Diagnosis Information:  · Diagnosis: B01.725W (ICD-10-CM) - Traumatic medial retinacular tear of knee, right, sequela  · Treatment Diagnosis: LE weakness and edema, decreased gait  Insurance/Certification information:  PT Insurance Information: medical mutual  Physician Information:  Referring Practitioner: AMANDA Villareal  Plan of care signed (Y/N): []  Yes [x]  No     Date of Patient follow up with Physician:      Progress Report: []  Yes  [x]  No     Date Range for reporting period:  Beginnin21  Ending:     Progress report due (10 Rx/or 30 days whichever is less): visit #10 or      Recertification due (POC duration/ or 90 days whichever is less): visit #12 or 10/14/21     Visit # Insurance Allowable Auth required? Date Range     []  Yes  []  No        Latex Allergy:  [x]NO      []YES  Preferred Language for Healthcare:   [x]English       []other:    Functional Scale:        Date assessed:  LEFS: raw score = 20/80; dysfunction = 60-79%  21    Pain level:  3-4/10     SUBJECTIVE:  Pt states that she is not feeling too bad today. A couple nights ago, R knee was burning and was very painful. Pt states that walking seems to make it feel better. R knee is more painful when she gets up after sitting for a while.     OBJECTIVE: 21: ITB irritation due to limping       RESTRICTIONS/PRECAUTIONS:     Exercises/Interventions:     Therapeutic Exercises (21965) Resistance / level Sets/sec Reps Notes   bike 5min   Pt states her knee loosens up as she rides   IB calf str/heel raise  HSS on step  HF stretch - prone w/ towel under knee  30sec/2sets  30sec  30sec 2x/10x     Manual - pt could not reach ankle, educ to use belt/strap at home   quad strengthening: Total gym with add ball squeeze  Mini squats     1  1   20x  20x           Nautilus machines:   knee ext,   knee flx,   leg press add                                  Therapeutic Activities (52256)       ROLANDO JOHNSON texted to pt and reviewed on her phone 10min                                  Neuromuscular Re-ed (39580)                                                 Manual Intervention ([id number])       ITB \"the stick\" add                                             Modalities: US 50% 1.6W/cm2 x8min 1MHz to R anterior and medial knee for pain and inflammation - pt reports relief afterwards    Pt. Education:  -patient educated on diagnosis, prognosis and expectations for rehab  -all patient questions were answered    Home Exercise Program:  Access Code: [email or web address]  URL: [email or web address]  Date: 07/14/2021  Prepared by: Phan Potter     Exercises  Straight Leg Raise - 1 x daily - 7 x weekly - 1 sets - 10 reps  Sidelying Hip Abduction - 1 x daily - 7 x weekly - 1 sets - 10 reps  Prone Hip Extension - 1 x daily - 7 x weekly - 1 sets - 10 reps  Mini Squat - 1 x daily - 7 x weekly - 1 sets - 10 reps  Supine Quad Set - 1 x daily - 7 x weekly - 1 sets - 10 reps - 5 sec hold         Therapeutic Exercise and NMR EXR  [x] (98407) Provided verbal/tactile cueing for activities related to strengthening, flexibility, endurance, ROM for improvements in  [x] LE / Lumbar: LE, proximal hip, and core control with self care, mobility, lifting, ambulation. [] UE / Cervical: cervical, postural, scapular, scapulothoracic and UE control with self care, reaching, carrying, lifting, house/yardwork, driving, computer work.  [] (68162) Provided verbal/tactile cueing for activities related to improving balance, coordination, kinesthetic sense, posture, motor skill, proprioception to assist with   [] LE / lumbar: LE, proximal hip, and core control in self care, mobility, lifting, ambulation and eccentric single leg control.    [] UE / cervical: cervical, scapular, scapulothoracic and UE control with self care, reaching, carrying, lifting, house/yardwork, driving, computer work.   [] (71378) Therapist is in constant attendance of 2 or more patients providing skilled therapy interventions, but not providing any significant amount of measurable one-on-one time to either patient, for improvements in  [] LE / lumbar: LE, proximal hip, and core control in self care, mobility, lifting, ambulation and eccentric single leg control. [] UE / cervical: cervical, scapular, scapulothoracic and UE control with self care, reaching, carrying, lifting, house/yardwork, driving, computer work. NMR and Therapeutic Activities:    [x] (94030 or 46367) Provided verbal/tactile cueing for activities related to improving balance, coordination, kinesthetic sense, posture, motor skill, proprioception and motor activation to allow for proper function of   [x] LE: / Lumbar core, proximal hip and LE with self care and ADLs  [] UE / Cervical: cervical, postural, scapular, scapulothoracic and UE control with self care, carrying, lifting, driving, computer work.   [] (55926) Gait Re-education- Provided training and instruction to the patient for proper LE, core and proximal hip recruitment and positioning and eccentric body weight control with ambulation re-education including up and down stairs     Home Management Training / Self Care:  [x] (58583) Provided self-care/home management training related to activities of daily living and compensatory training, and/or use of adaptive equipment for improvement with: ADLs and compensatory training, meal preparation, safety procedures and instruction in use of adaptive equipment, including bathing, grooming, dressing, personal hygiene, basic household cleaning and chores.      Home Exercise Program:    [x] (03739) Reviewed/Progressed HEP activities related to strengthening, flexibility, endurance, ROM of   [x] LE / Lumbar: core, proximal hip and LE for functional self-care, mobility, lifting and ambulation/stair navigation   [] UE / Cervical: cervical, postural, scapular, scapulothoracic and UE control with self care, reaching, carrying, lifting, house/yardwork, driving, computer work  [] (48754)Reviewed/Progressed HEP activities related to improving balance, coordination, kinesthetic sense, posture, motor skill, proprioception of   [] LE: core, proximal hip and LE for self care, mobility, lifting, and ambulation/stair navigation    [] UE / Cervical: cervical, postural,  scapular, scapulothoracic and UE control with self care, reaching, carrying, lifting, house/yardwork, driving, computer work    Manual Treatments:  PROM / STM / Oscillations-Mobs:  G-I, II, III, IV (PA's, Inf., Post.)  [] (30698) Provided manual therapy to mobilize LE, proximal hip and/or LS spine soft tissue/joints for the purpose of modulating pain, promoting relaxation,  increasing ROM, reducing/eliminating soft tissue swelling/inflammation/restriction, improving soft tissue extensibility and allowing for proper ROM for normal function with   [] LE / lumbar: self care, mobility, lifting and ambulation. [] UE / Cervical: self care, reaching, carrying, lifting, house/yardwork, driving, computer work. Modalities:  [] (70107) Vasopneumatic compression: Utilized vasopneumatic compression to decrease edema / swelling for the purpose of improving mobility and quad tone / recruitment which will allow for increased overall function including but not limited to self-care, transfers, ambulation, and ascending / descending stairs.      Charges:  Timed Code Treatment Minutes: 40   Total Treatment Minutes: 40     [] EVAL - LOW (72382)   [] EVAL - MOD (70379)  [] EVAL - HIGH (54118)  [] RE-EVAL (23184)  [x] ZZ(72547) x 2      [] Ionto  [] NMR (81728) x       [] Vaso  [] Manual (33482) x       [x] Ultrasound  [] TA x        [] Mech Traction (10753)  [] Aquatic Therapy x      [] ES (un) (16255):   [] Home Management Training x  [] ES(attended) (47208)   [] Dry Needling 1-2 muscles (97145):  [] Dry Needling 3+ muscles (905333)  [] Group:      [] Other:     GOALS:   Patient stated goal: return to using the knee regularly without pain     Therapist goals for Patient:   Short Term Goals: To be achieved in: 2 weeks  1. Independent in HEP and progression per patient tolerance, in order to prevent re-injury. 2. Patient will have a decrease in pain to facilitate improvement in movement, function, and ADLs as indicated by Functional Deficits.     Long Term Goals: To be achieved in: 6 weeks  1. Disability index score of 20% or less for the NDI to assist with reaching prior level of function. 2. Patient will demonstrate increased AROM to [facility] to allow for proper joint functioning as indicated by patients Functional Deficits. 3. Patient will demonstrate an increase in postural awareness and control to allow for proper functional mobility as indicated by patients Functional Deficits. 4. Patient will demonstrate an increase in Strength to at least  to allow for proper functional mobility as indicated by patients Functional Deficits. 5. Patient will return to functional activities including getting out of the car easily without increased symptoms or restriction. Overall Progression Towards Functional goals/ Treatment Progress Update:  [] Patient is progressing as expected towards functional goals listed. [] Progression is slowed due to complexities/Impairments listed. [] Progression has been slowed due to co-morbidities.   [x] Plan just implemented, too soon to assess goals progression <30days   [] Goals require adjustment due to lack of progress  [] Patient is not progressing as expected and requires additional follow up with physician  [] Other    Persisting Functional Limitations/Impairments:  []Sleeping [x]Sitting               [x]Standing [x]Transfers        [x]Walking [x]Kneeling               [x]Stairs [x]Squatting / bending   [x]ADLs []Reaching  [x]Lifting  []Housework  []Driving [x]Job related tasks  []Sports/Recreation []Other:        ASSESSMENT:  Pt states her knee feels much better - felt US helped significantly. AROM improve to 0-133 vs 0-127 at eval.  Will cont to progress as able. Treatment/Activity Tolerance:  [x] Patient able to complete tx [] Patient limited by fatigue  [] Patient limited by pain  [] Patient limited by other medical complications  [] Other:     Prognosis: [x] Good [] Fair  [] Poor    Patient Requires Follow-up: [x] Yes  [] No    Plan for next treatment session:    PLAN: See eval. PT 2x / week for 6 weeks. [x] Continue per plan of care [] Alter current plan (see comments)  [] Plan of care initiated [] Hold pending MD visit [] Discharge    Electronically signed by: Vandana Nieto, PT [id number]    Note: If patient does not return for scheduled/ recommended follow up visits, this note will serve as a discharge from care along with most recent update on progress.

## 2021-08-03 ENCOUNTER — HOSPITAL ENCOUNTER (OUTPATIENT)
Dept: PHYSICAL THERAPY | Age: 36
Setting detail: THERAPIES SERIES
Discharge: HOME | End: 2021-08-03
Payer: COMMERCIAL

## 2021-08-03 PROCEDURE — 97116 GAIT TRAINING THERAPY: CPT

## 2021-08-03 PROCEDURE — 97110 THERAPEUTIC EXERCISES: CPT

## 2021-08-03 PROCEDURE — 97112 NEUROMUSCULAR REEDUCATION: CPT

## 2021-08-03 NOTE — FLOWSHEET NOTE
[address] [facility] Physical Therapy  Phone: [phone]   Fax: [phone]    Physical Therapy Daily Treatment Note  Date:  8/3/2021    Patient Name:  Delisa Gallegos    :  1985  MRN: [id number]  Medical/Treatment Diagnosis Information:  · Diagnosis: Y73.560K (ICD-10-CM) - Traumatic medial retinacular tear of knee, right, sequela  · Treatment Diagnosis: LE weakness and edema, decreased gait  Insurance/Certification information:  PT Insurance Information: medical mutual  Physician Information:  Referring Practitioner: AMANDA Boo  Plan of care signed (Y/N): []  Yes [x]  No     Date of Patient follow up with Physician:      Progress Report: []  Yes  [x]  No     Date Range for reporting period:  Beginnin21  Ending:     Progress report due (10 Rx/or 30 days whichever is less): visit #10 or 37     Recertification due (POC duration/ or 90 days whichever is less): visit #12 or 10/14/21     Visit # Insurance Allowable Auth required? Date Range   3/12  []  Yes  []  No        Latex Allergy:  [x]NO      []YES  Preferred Language for Healthcare:   [x]English       []other:    Functional Scale:        Date assessed:  LEFS: raw score = 20/80; dysfunction = 60-79%  21    Pain level:  4-5/10     SUBJECTIVE:  Pt reports bending her knee irritates it. Squats are very painful so she's been avoiding them. OBJECTIVE: 21: ITB irritation due to limping       RESTRICTIONS/PRECAUTIONS:     Exercises/Interventions:     Therapeutic Exercises (17934) Resistance / level Sets/sec Reps Notes   bike 5 min   Pt states her knee loosens up as she rides   IB calf str  heel raise/ heel raise  Knee flexion stretch on step   HSS on step    30sec  2sets  30sec  30sec 2x  10x  x2 B  x2 B     Manual - pt could not reach ankle, educ to use belt/strap at home   quad strengthening:    Total gym with add ball squeeze  Mini squats   Single leg squats       1  1     20x  x15 B       VCs and TCs for correct knee position           Nautilus machines:   knee ext  knee flx   leg press     NPV  NPV  NPV                                Therapeutic Activities (49429)       HEP HO texted to pt and reviewed on her phone                                   Gait (19419)       Forward step downs  Lateral dips  6 inch  6 inch  1  1 x15 B  x15 B B UE support for all    amb with heel toe pattern, glute isos, and neutral knees, no recurvatum   x140 feet x2                  Neuromuscular Re-ed (21189)       BOSU balance with EO  BOSU balance with EC  One foot on BOSU, one foot on floor with weighted ball thor rot   BOSU lateral step overs      5# 30 sec  10 sec  x1    x1 x2  x3  x10 B    x10 B         Light UE support    Finding neutral stance position    X 5 min                                Manual Intervention (65003)       ITB \"the stick\" add                                             Modalities:   8/3: ice to go   7/31: US 50% 1.6W/cm2 x8min 1MHz to R anterior and medial knee for pain and inflammation - pt reports relief afterwards    Pt. Education:  -patient educated on diagnosis, prognosis and expectations for rehab  -all patient questions were answered    Home Exercise Program:  Access Code: [email or web address]  URL: [email or web address]  Date: 07/14/2021  Prepared by: Ramon Guajardo     Exercises  Straight Leg Raise - 1 x daily - 7 x weekly - 1 sets - 10 reps  Sidelying Hip Abduction - 1 x daily - 7 x weekly - 1 sets - 10 reps  Prone Hip Extension - 1 x daily - 7 x weekly - 1 sets - 10 reps  Mini Squat - 1 x daily - 7 x weekly - 1 sets - 10 reps  Supine Quad Set - 1 x daily - 7 x weekly - 1 sets - 10 reps - 5 sec hold         Therapeutic Exercise and NMR EXR  [x] (61405) Provided verbal/tactile cueing for activities related to strengthening, flexibility, endurance, ROM for improvements in  [x] LE / Lumbar: LE, proximal hip, and core control with self care, mobility, lifting, ambulation.   [] UE / Cervical: cervical, postural, scapular, scapulothoracic and UE control with self care, reaching, carrying, lifting, house/yardwork, driving, computer work.  [] (06220) Provided verbal/tactile cueing for activities related to improving balance, coordination, kinesthetic sense, posture, motor skill, proprioception to assist with   [] LE / lumbar: LE, proximal hip, and core control in self care, mobility, lifting, ambulation and eccentric single leg control. [] UE / cervical: cervical, scapular, scapulothoracic and UE control with self care, reaching, carrying, lifting, house/yardwork, driving, computer work.   [] (23445) Therapist is in constant attendance of 2 or more patients providing skilled therapy interventions, but not providing any significant amount of measurable one-on-one time to either patient, for improvements in  [] LE / lumbar: LE, proximal hip, and core control in self care, mobility, lifting, ambulation and eccentric single leg control. [] UE / cervical: cervical, scapular, scapulothoracic and UE control with self care, reaching, carrying, lifting, house/yardwork, driving, computer work. NMR and Therapeutic Activities:    [x] (57901 or 14741) Provided verbal/tactile cueing for activities related to improving balance, coordination, kinesthetic sense, posture, motor skill, proprioception and motor activation to allow for proper function of   [x] LE: / Lumbar core, proximal hip and LE with self care and ADLs  [] UE / Cervical: cervical, postural, scapular, scapulothoracic and UE control with self care, carrying, lifting, driving, computer work.    [x] (54324) Gait Re-education- Provided training and instruction to the patient for proper LE, core and proximal hip recruitment and positioning and eccentric body weight control with ambulation re-education including up and down stairs     Home Management Training / Self Care:  [] (54088) Provided self-care/home management training related to activities of daily living and compensatory training, and/or use of adaptive equipment for improvement with: ADLs and compensatory training, meal preparation, safety procedures and instruction in use of adaptive equipment, including bathing, grooming, dressing, personal hygiene, basic household cleaning and chores. Home Exercise Program:    [x] (08034) Reviewed/Progressed HEP activities related to strengthening, flexibility, endurance, ROM of   [x] LE / Lumbar: core, proximal hip and LE for functional self-care, mobility, lifting and ambulation/stair navigation   [] UE / Cervical: cervical, postural, scapular, scapulothoracic and UE control with self care, reaching, carrying, lifting, house/yardwork, driving, computer work  [] (68888)Reviewed/Progressed HEP activities related to improving balance, coordination, kinesthetic sense, posture, motor skill, proprioception of   [] LE: core, proximal hip and LE for self care, mobility, lifting, and ambulation/stair navigation    [] UE / Cervical: cervical, postural,  scapular, scapulothoracic and UE control with self care, reaching, carrying, lifting, house/yardwork, driving, computer work    Manual Treatments:  PROM / STM / Oscillations-Mobs:  G-I, II, III, IV (PA's, Inf., Post.)  [] (30436) Provided manual therapy to mobilize LE, proximal hip and/or LS spine soft tissue/joints for the purpose of modulating pain, promoting relaxation,  increasing ROM, reducing/eliminating soft tissue swelling/inflammation/restriction, improving soft tissue extensibility and allowing for proper ROM for normal function with   [] LE / lumbar: self care, mobility, lifting and ambulation. [] UE / Cervical: self care, reaching, carrying, lifting, house/yardwork, driving, computer work.      Modalities:  [] (32628) Vasopneumatic compression: Utilized vasopneumatic compression to decrease edema / swelling for the purpose of improving mobility and quad tone / recruitment which will allow for increased overall function including but not limited to self-care, transfers, ambulation, and ascending / descending stairs. Charges:  Timed Code Treatment Minutes: 45   Total Treatment Minutes: 45     [] EVAL - LOW (67622)   [] EVAL - MOD (13346)  [] EVAL - HIGH (55352)  [] RE-EVAL (46009)  [x] OB(88401) x  1     [] Ionto  [x] NMR (06364) x  1     [] Vaso  [] Manual (27109) x       [] Ultrasound  [] TA x        [] Mech Traction (26745)  [] Aquatic Therapy x      [] ES (un) (52052):   [] Home Management Training x  [] ES(attended) (22382)   [] Dry Needling 1-2 muscles (19571):  [] Dry Needling 3+ muscles (333399)  [] Group:      [x] Other: gait x 1      GOALS:   Patient stated goal: return to using the knee regularly without pain     Therapist goals for Patient:   Short Term Goals: To be achieved in: 2 weeks  1. Independent in HEP and progression per patient tolerance, in order to prevent re-injury. 2. Patient will have a decrease in pain to facilitate improvement in movement, function, and ADLs as indicated by Functional Deficits.     Long Term Goals: To be achieved in: 6 weeks  1. Disability index score of 20% or less for the NDI to assist with reaching prior level of function. 2. Patient will demonstrate increased AROM to [facility] to allow for proper joint functioning as indicated by patients Functional Deficits. 3. Patient will demonstrate an increase in postural awareness and control to allow for proper functional mobility as indicated by patients Functional Deficits. 4. Patient will demonstrate an increase in Strength to at least  to allow for proper functional mobility as indicated by patients Functional Deficits. 5. Patient will return to functional activities including getting out of the car easily without increased symptoms or restriction. Overall Progression Towards Functional goals/ Treatment Progress Update:  [] Patient is progressing as expected towards functional goals listed.     [] Progression is slowed due to complexities/Impairments listed. [] Progression has been slowed due to co-morbidities. [x] Plan just implemented, too soon to assess goals progression <30days   [] Goals require adjustment due to lack of progress  [] Patient is not progressing as expected and requires additional follow up with physician  [] Other    Persisting Functional Limitations/Impairments:  []Sleeping [x]Sitting               [x]Standing [x]Transfers        [x]Walking [x]Kneeling               [x]Stairs [x]Squatting / bending   [x]ADLs []Reaching  [x]Lifting  []Housework  []Driving [x]Job related tasks  []Sports/Recreation []Other:        ASSESSMENT:  Focused heavily on neutral knee position with all activities. Pt responded favorably to proprioceptive training and strength. Encouraged pt to try neutral stance and glute isos during work. Pain decreased at end of session. Treatment/Activity Tolerance:  [x] Patient able to complete tx [] Patient limited by fatigue  [] Patient limited by pain  [] Patient limited by other medical complications  [] Other:     Prognosis: [x] Good [] Fair  [] Poor    Patient Requires Follow-up: [x] Yes  [] No    Plan for next treatment session:    PLAN: See eval. PT 2x / week for 6 weeks. [x] Continue per plan of care [] Alter current plan (see comments)  [] Plan of care initiated [] Hold pending MD visit [] Discharge    Electronically signed by: Carolina Angela, PT, DPT    Note: If patient does not return for scheduled/ recommended follow up visits, this note will serve as a discharge from care along with most recent update on progress.

## 2021-08-10 ENCOUNTER — HOSPITAL ENCOUNTER (OUTPATIENT)
Dept: PHYSICAL THERAPY | Age: 36
Setting detail: THERAPIES SERIES
Discharge: HOME | End: 2021-08-10
Payer: COMMERCIAL

## 2021-08-10 PROCEDURE — 97110 THERAPEUTIC EXERCISES: CPT

## 2021-08-10 NOTE — FLOWSHEET NOTE
[address] [facility] Physical Therapy  Phone: [phone]   Fax: [phone]    Physical Therapy Daily Treatment Note  Date:  8/10/2021    Patient Name:  Yen Sosa    :  1985  MRN: [id number]  Medical/Treatment Diagnosis Information:  · Diagnosis: V60.537S (ICD-10-CM) - Traumatic medial retinacular tear of knee, right, sequela  · Treatment Diagnosis: LE weakness and edema, decreased gait  Insurance/Certification information:  PT Insurance Information: medical mutual  Physician Information:  Referring Practitioner: AMANDA Arguello  Plan of care signed (Y/N): []  Yes [x]  No     Date of Patient follow up with Physician:      Progress Report: []  Yes  [x]  No     Date Range for reporting period:  Beginnin21  Ending:     Progress report due (10 Rx/or 30 days whichever is less): visit #10 or      Recertification due (POC duration/ or 90 days whichever is less): visit #12 or 10/14/21     Visit # Insurance Allowable Auth required? Date Range     []  Yes  []  No        Latex Allergy:  [x]NO      []YES  Preferred Language for Healthcare:   [x]English       []other:    Functional Scale:        Date assessed:  LEFS: raw score = 20/80; dysfunction = 60-79%  21    Pain level:  3-4/10      SUBJECTIVE:  Pt reports her knee is feeling better. Has pain when she tries to  neutral. 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 OBJECTIVE: 21: ITB irritation due to limping       RESTRICTIONS/PRECAUTIONS:     Exercises/Interventions:     Therapeutic Exercises (38184) Resistance / level Sets/sec Reps Notes   bike 5 min   Pt states her knee loosens up as she rides   IB calf str  heel raise/ heel raise  Knee flexion stretch on step   HSS on step        Manual - pt could not reach ankle, educ to use belt/strap at home   quad strengthening:    Total gym with add ball squeeze  Mini squats   Single leg squats         VCs and TCs for correct knee position           [employer]:  Leg press  Leg ext  Ham curl  Hip abd   Hip add    TRX squats  TRX forward lunges    100#  10#  20#  45#  35#     1  1  1  1  1    1  1   x15  x15  x15  x15  x15    x15  x15                                Therapeutic Activities (54444)       HEP HO texted to pt and reviewed on her phone                                   Gait (13630)       Forward step downs  Lateral dips  B UE support for all    amb with heel toe pattern, glute isos, and neutral knees, no recurvatum                   Neuromuscular Re-ed (44143)       BOSU balance with EO  BOSU balance with EC  One foot on BOSU, one foot on floor with weighted ball thor rot   BOSU lateral step overs          Light UE support    Finding neutral stance position                                 Manual Intervention (64213)       ITB \"the stick\" add                                             Modalities:   8/10, 8/3: ice to go     7/31: US 50% 1.6W/cm2 x8min 1MHz to R anterior and medial knee for pain and inflammation - pt reports relief afterwards    Pt. Education:  -patient educated on diagnosis, prognosis and expectations for rehab  -all patient questions were answered    Home Exercise Program:  Access Code: [email or web address]  URL: [email or web address]  Date: 07/14/2021  Prepared by: Araceli Dimas     Exercises  Straight Leg Raise - 1 x daily - 7 x weekly - 1 sets - 10 reps  Sidelying Hip Abduction - 1 x daily - 7 x weekly - 1 sets - 10 reps  Prone Hip Extension - 1 x daily - 7 x weekly - 1 sets - 10 reps  Mini Squat - 1 x daily - 7 x weekly - 1 sets - 10 reps  Supine Quad Set - 1 x daily - 7 x weekly - 1 sets - 10 reps - 5 sec hold         Therapeutic Exercise and NMR EXR  [x] (69024) Provided verbal/tactile cueing for activities related to strengthening, flexibility, endurance, ROM for improvements in  [x] LE / Lumbar: LE, proximal hip, and core control with self care, mobility, lifting, ambulation.   [] UE / Cervical: cervical, compensatory training, and/or use of adaptive equipment for improvement with: ADLs and compensatory training, meal preparation, safety procedures and instruction in use of adaptive equipment, including bathing, grooming, dressing, personal hygiene, basic household cleaning and chores. Home Exercise Program:    [] (96591) Reviewed/Progressed HEP activities related to strengthening, flexibility, endurance, ROM of   [] LE / Lumbar: core, proximal hip and LE for functional self-care, mobility, lifting and ambulation/stair navigation   [] UE / Cervical: cervical, postural, scapular, scapulothoracic and UE control with self care, reaching, carrying, lifting, house/yardwork, driving, computer work  [] (16899)Reviewed/Progressed HEP activities related to improving balance, coordination, kinesthetic sense, posture, motor skill, proprioception of   [] LE: core, proximal hip and LE for self care, mobility, lifting, and ambulation/stair navigation    [] UE / Cervical: cervical, postural,  scapular, scapulothoracic and UE control with self care, reaching, carrying, lifting, house/yardwork, driving, computer work    Manual Treatments:  PROM / STM / Oscillations-Mobs:  G-I, II, III, IV (PA's, Inf., Post.)  [] (54316) Provided manual therapy to mobilize LE, proximal hip and/or LS spine soft tissue/joints for the purpose of modulating pain, promoting relaxation,  increasing ROM, reducing/eliminating soft tissue swelling/inflammation/restriction, improving soft tissue extensibility and allowing for proper ROM for normal function with   [] LE / lumbar: self care, mobility, lifting and ambulation. [] UE / Cervical: self care, reaching, carrying, lifting, house/yardwork, driving, computer work.      Modalities:  [] (28699) Vasopneumatic compression: Utilized vasopneumatic compression to decrease edema / swelling for the purpose of improving mobility and quad tone / recruitment which will allow for increased overall function including but not limited to self-care, transfers, ambulation, and ascending / descending stairs. Charges:  Timed Code Treatment Minutes: 39   Total Treatment Minutes: 39     [] EVAL - LOW (98758)   [] EVAL - MOD (35762)  [] EVAL - HIGH (76250)  [] RE-EVAL (97840)  [x] YY(34055) x  3     [] Ionto  [] NMR (56546) x       [] Vaso  [] Manual (77600) x       [] Ultrasound  [] TA x        [] Mech Traction (38468)  [] Aquatic Therapy x      [] ES (un) (23783):   [] Home Management Training x  [] ES(attended) (09734)   [] Dry Needling 1-2 muscles (62055):  [] Dry Needling 3+ muscles (467560)  [] Group:      [] Other: gait x     GOALS:   Patient stated goal: return to using the knee regularly without pain     Therapist goals for Patient:   Short Term Goals: To be achieved in: 2 weeks  1. Independent in HEP and progression per patient tolerance, in order to prevent re-injury. 2. Patient will have a decrease in pain to facilitate improvement in movement, function, and ADLs as indicated by Functional Deficits.     Long Term Goals: To be achieved in: 6 weeks  1. Disability index score of 20% or less for the NDI to assist with reaching prior level of function. 2. Patient will demonstrate increased AROM to [facility] to allow for proper joint functioning as indicated by patients Functional Deficits. 3. Patient will demonstrate an increase in postural awareness and control to allow for proper functional mobility as indicated by patients Functional Deficits. 4. Patient will demonstrate an increase in Strength to at least  to allow for proper functional mobility as indicated by patients Functional Deficits. 5. Patient will return to functional activities including getting out of the car easily without increased symptoms or restriction. Overall Progression Towards Functional goals/ Treatment Progress Update:  [] Patient is progressing as expected towards functional goals listed.     [] Progression is slowed due to complexities/Impairments listed. [] Progression has been slowed due to co-morbidities. [x] Plan just implemented, too soon to assess goals progression <30days   [] Goals require adjustment due to lack of progress  [] Patient is not progressing as expected and requires additional follow up with physician  [] Other    Persisting Functional Limitations/Impairments:  []Sleeping [x]Sitting               [x]Standing [x]Transfers        [x]Walking [x]Kneeling               [x]Stairs [x]Squatting / bending   [x]ADLs []Reaching  [x]Lifting  []Housework  []Driving [x]Job related tasks  []Sports/Recreation []Other:        ASSESSMENT:  Pt did well with [facility] activities. No increase in pain throughout. Nervous to complete squat and lunge, but did well with cues for form. Treatment/Activity Tolerance:  [x] Patient able to complete tx [] Patient limited by fatigue  [] Patient limited by pain  [] Patient limited by other medical complications  [] Other:     Prognosis: [x] Good [] Fair  [] Poor    Patient Requires Follow-up: [x] Yes  [] No    Plan for next treatment session:    PLAN: See eval. PT 2x / week for 6 weeks. [x] Continue per plan of care [] Alter current plan (see comments)  [] Plan of care initiated [] Hold pending MD visit [] Discharge    Electronically signed by: Hal Mooney, PT, DPT    Note: If patient does not return for scheduled/ recommended follow up visits, this note will serve as a discharge from care along with most recent update on progress.

## 2021-08-19 ENCOUNTER — HOSPITAL ENCOUNTER (OUTPATIENT)
Dept: PHYSICAL THERAPY | Age: 36
Setting detail: THERAPIES SERIES
Discharge: HOME | End: 2021-08-19
Payer: COMMERCIAL

## 2021-08-19 PROCEDURE — G0283 ELEC STIM OTHER THAN WOUND: HCPCS

## 2021-08-19 PROCEDURE — 97140 MANUAL THERAPY 1/> REGIONS: CPT

## 2021-08-19 PROCEDURE — 97110 THERAPEUTIC EXERCISES: CPT

## 2021-08-19 NOTE — FLOWSHEET NOTE
[facility] Physical Therapy  Phone: [phone]   Fax: [phone]    Physical Therapy Daily Treatment Note  Date:  2021    Patient Name:  Noni Westbrook    :  1985  MRN: [id number]  Medical/Treatment Diagnosis Information:  · Diagnosis: L35.198R (ICD-10-CM) - Traumatic medial retinacular tear of knee, right, sequela  · Treatment Diagnosis: LE weakness and edema, decreased gait  Insurance/Certification information:  PT Insurance Information: medical mutual  Physician Information:  Referring Practitioner: AMANDA Galvez  Plan of care signed (Y/N): []  Yes [x]  No     Date of Patient follow up with Physician:      Progress Report: []  Yes  [x]  No     Date Range for reporting period:  Beginnin21  Ending:     Progress report due (10 Rx/or 30 days whichever is less): visit #10 or      Recertification due (POC duration/ or 90 days whichever is less): visit #12 or 10/14/21     Visit # Insurance Allowable Auth required? Date Range     []  Yes  []  No        Latex Allergy:  [x]NO      []YES  Preferred Language for Healthcare:   [x]English       []other:    Functional Scale:        Date assessed:  LEFS: raw score = 20/80; dysfunction = 60-79%  21    Pain level:  5-6/10      SUBJECTIVE:  Pt reports yesterday her knee pain was really bad - was very busy and was walking nonstop. Hasn't worn her knee brace since beginning therapy, but did wear it today at work. Feeling disappointed because she felt like she was on such a good path.      OBJECTIVE: 21: ITB irritation due to limping       RESTRICTIONS/PRECAUTIONS:     Exercises/Interventions:     Therapeutic Exercises (30253) Resistance / level Sets/sec Reps Notes   Bike    Step one      Level 1      X 5 min  Pt states her knee loosens up as she rides   IB calf str  heel raise/ heel raise  Knee flexion stretch on step   HSS on step        Manual - pt could not reach ankle, educ to use belt/strap at home   quad strengthening: Total gym with add ball squeeze  Mini squats   Single leg squats   1  120x  x15 B      VCs and TCs for correct knee position           [facility]:  Leg press  Leg ext  Ham curl  Hip abd   Hip add    TRX squats  TRX forward lunges                                 Therapeutic Activities (00897)       HEP HO texted to pt and reviewed on her phone                                   Gait (06576)       Forward step downs  Lateral dips  B UE support for all    amb with heel toe pattern, glute isos, and neutral knees, no recurvatum                   Neuromuscular Re-ed (46407)       BOSU balance with EO  BOSU balance with EC  One foot on BOSU, one foot on floor with weighted ball thor rot   BOSU lateral step overs          Light UE support    Finding neutral stance position                                 Manual Intervention (51253)       ITB \"the stick\" add      Flexion mobs grade 2, ext mobs grade 2, pat mobs all directions grade 2, PROM flexion and ext    X 8 min                                     Modalities:     8/19: IFC and CP to R knee in supine x 15 min   8/10, 8/3: ice to go   7/31: US 50% 1.6W/cm2 x8min 1MHz to R anterior and medial knee for pain and inflammation - pt reports relief afterwards    Pt. Education:  -patient educated on diagnosis, prognosis and expectations for rehab  -all patient questions were answered    Home Exercise Program:  Access Code: [email or web address]  URL: [email or web address]  Date: 07/14/2021  Prepared by: Veronica Bejarano     Exercises  Straight Leg Raise - 1 x daily - 7 x weekly - 1 sets - 10 reps  Sidelying Hip Abduction - 1 x daily - 7 x weekly - 1 sets - 10 reps  Prone Hip Extension - 1 x daily - 7 x weekly - 1 sets - 10 reps  Mini Squat - 1 x daily - 7 x weekly - 1 sets - 10 reps  Supine Quad Set - 1 x daily - 7 x weekly - 1 sets - 10 reps - 5 sec hold         Therapeutic Exercise and NMR EXR  [x] (80709) Provided verbal/tactile cueing for activities related to strengthening, flexibility, endurance, ROM for improvements in  [x] LE / Lumbar: LE, proximal hip, and core control with self care, mobility, lifting, ambulation. [] UE / Cervical: cervical, postural, scapular, scapulothoracic and UE control with self care, reaching, carrying, lifting, house/yardwork, driving, computer work.  [] (97365) Provided verbal/tactile cueing for activities related to improving balance, coordination, kinesthetic sense, posture, motor skill, proprioception to assist with   [] LE / lumbar: LE, proximal hip, and core control in self care, mobility, lifting, ambulation and eccentric single leg control. [] UE / cervical: cervical, scapular, scapulothoracic and UE control with self care, reaching, carrying, lifting, house/yardwork, driving, computer work.   [] (79359) Therapist is in constant attendance of 2 or more patients providing skilled therapy interventions, but not providing any significant amount of measurable one-on-one time to either patient, for improvements in  [] LE / lumbar: LE, proximal hip, and core control in self care, mobility, lifting, ambulation and eccentric single leg control. [] UE / cervical: cervical, scapular, scapulothoracic and UE control with self care, reaching, carrying, lifting, house/yardwork, driving, computer work.      NMR and Therapeutic Activities:    [] (61606 or 37909) Provided verbal/tactile cueing for activities related to improving balance, coordination, kinesthetic sense, posture, motor skill, proprioception and motor activation to allow for proper function of   [] LE: / Lumbar core, proximal hip and LE with self care and ADLs  [] UE / Cervical: cervical, postural, scapular, scapulothoracic and UE control with self care, carrying, lifting, driving, computer work.   [] (66741) Gait Re-education- Provided training and instruction to the patient for proper LE, core and proximal hip recruitment and positioning and eccentric body weight control with ambulation re-education including up and down stairs     Home Management Training / Self Care:  [] (91017) Provided self-care/home management training related to activities of daily living and compensatory training, and/or use of adaptive equipment for improvement with: ADLs and compensatory training, meal preparation, safety procedures and instruction in use of adaptive equipment, including bathing, grooming, dressing, personal hygiene, basic household cleaning and chores. Home Exercise Program:    [] (43279) Reviewed/Progressed HEP activities related to strengthening, flexibility, endurance, ROM of   [] LE / Lumbar: core, proximal hip and LE for functional self-care, mobility, lifting and ambulation/stair navigation   [] UE / Cervical: cervical, postural, scapular, scapulothoracic and UE control with self care, reaching, carrying, lifting, house/yardwork, driving, computer work  [] (89667)Reviewed/Progressed HEP activities related to improving balance, coordination, kinesthetic sense, posture, motor skill, proprioception of   [] LE: core, proximal hip and LE for self care, mobility, lifting, and ambulation/stair navigation    [] UE / Cervical: cervical, postural,  scapular, scapulothoracic and UE control with self care, reaching, carrying, lifting, house/yardwork, driving, computer work    Manual Treatments:  PROM / STM / Oscillations-Mobs:  G-I, II, III, IV (PA's, Inf., Post.)  [x] (70425) Provided manual therapy to mobilize LE, proximal hip and/or LS spine soft tissue/joints for the purpose of modulating pain, promoting relaxation,  increasing ROM, reducing/eliminating soft tissue swelling/inflammation/restriction, improving soft tissue extensibility and allowing for proper ROM for normal function with   [x] LE / lumbar: self care, mobility, lifting and ambulation. [] UE / Cervical: self care, reaching, carrying, lifting, house/yardwork, driving, computer work.      Modalities:  [] (24109) Vasopneumatic compression: Utilized vasopneumatic compression to decrease edema / swelling for the purpose of improving mobility and quad tone / recruitment which will allow for increased overall function including but not limited to self-care, transfers, ambulation, and ascending / descending stairs. Charges:  Timed Code Treatment Minutes: 32   Total Treatment Minutes: 47      [] EVAL - LOW (21467)   [] EVAL - MOD (47327)  [] EVAL - HIGH (83953)  [] RE-EVAL (63017)  [x] LS(79926) x  1     [] Ionto  [] NMR (96026) x       [] Vaso  [x] Manual (73319) x 1      [] Ultrasound  [] TA x        [] Mech Traction (85252)  [] Aquatic Therapy x      [x] ES (un) (32063): x 1  [] Home Management Training x  [] ES(attended) (68084)   [] Dry Needling 1-2 muscles (34060):  [] Dry Needling 3+ muscles (006204)  [] Group:      [] Other: gait x     GOALS:   Patient stated goal: return to using the knee regularly without pain     Therapist goals for Patient:   Short Term Goals: To be achieved in: 2 weeks  1. Independent in HEP and progression per patient tolerance, in order to prevent re-injury. 2. Patient will have a decrease in pain to facilitate improvement in movement, function, and ADLs as indicated by Functional Deficits.     Long Term Goals: To be achieved in: 6 weeks  1. Disability index score of 20% or less for the NDI to assist with reaching prior level of function. 2. Patient will demonstrate increased AROM to [facility] to allow for proper joint functioning as indicated by patients Functional Deficits. 3. Patient will demonstrate an increase in postural awareness and control to allow for proper functional mobility as indicated by patients Functional Deficits. 4. Patient will demonstrate an increase in Strength to at least  to allow for proper functional mobility as indicated by patients Functional Deficits.    5. Patient will return to functional activities including getting out of the car easily without increased symptoms or restriction. Overall Progression Towards Functional goals/ Treatment Progress Update:  [] Patient is progressing as expected towards functional goals listed. [] Progression is slowed due to complexities/Impairments listed. [] Progression has been slowed due to co-morbidities. [x] Plan just implemented, too soon to assess goals progression <30days   [] Goals require adjustment due to lack of progress  [] Patient is not progressing as expected and requires additional follow up with physician  [] Other    Persisting Functional Limitations/Impairments:  []Sleeping [x]Sitting               [x]Standing [x]Transfers        [x]Walking [x]Kneeling               [x]Stairs [x]Squatting / bending   [x]ADLs []Reaching  [x]Lifting  []Housework  []Driving [x]Job related tasks  []Sports/Recreation []Other:        ASSESSMENT:  Pt painful and swollen this date. Limited therex due to pain. Trialled estim at end of session - will montior response. Treatment/Activity Tolerance:  [x] Patient able to complete tx [] Patient limited by fatigue  [] Patient limited by pain  [] Patient limited by other medical complications  [] Other:     Prognosis: [x] Good [] Fair  [] Poor    Patient Requires Follow-up: [x] Yes  [] No    Plan for next treatment session:    PLAN: See eval. PT 2x / week for 6 weeks. [x] Continue per plan of care [] Alter current plan (see comments)  [] Plan of care initiated [] Hold pending MD visit [] Discharge    Electronically signed by: Sara Schuler PT, DPT    Note: If patient does not return for scheduled/ recommended follow up visits, this note will serve as a discharge from care along with most recent update on progress.

## 2021-08-25 ENCOUNTER — HOSPITAL ENCOUNTER (OUTPATIENT)
Dept: PHYSICAL THERAPY | Age: 36
Setting detail: THERAPIES SERIES
Discharge: HOME | End: 2021-08-25
Payer: COMMERCIAL

## 2021-08-25 PROCEDURE — 97110 THERAPEUTIC EXERCISES: CPT

## 2021-08-25 PROCEDURE — 97530 THERAPEUTIC ACTIVITIES: CPT

## 2021-08-25 NOTE — FLOWSHEET NOTE
[address] [facility] Physical Therapy  Phone: [phone]   Fax: [phone]    Physical Therapy Daily Treatment Note  Date:  2021    Patient Name:  Keyona Cespedes    :  1985  MRN: [id number]  Medical/Treatment Diagnosis Information:  · Diagnosis: G54.603H (ICD-10-CM) - Traumatic medial retinacular tear of knee, right, sequela  · Treatment Diagnosis: LE weakness and edema, decreased gait  Insurance/Certification information:  PT Insurance Information: medical mutual  Physician Information:  Referring Practitioner: AMANDA Chavez  Plan of care signed (Y/N): []  Yes [x]  No     Date of Patient follow up with Physician:      Progress Report: []  Yes  [x]  No     Date Range for reporting period:  Beginnin21  Ending:     Progress report due (10 Rx/or 30 days whichever is less): visit #10 or 21     Recertification due (POC duration/ or 90 days whichever is less): visit #12 or 10/14/21     Visit # Insurance Allowable Auth required? Date Range     []  Yes  []  No        Latex Allergy:  [x]NO      []YES  Preferred Language for Healthcare:   [x]English       []other:    Functional Scale:        Date assessed:  LEFS: raw score = 20/80; dysfunction = 60-79%  21    Pain level:  3-4/10      SUBJECTIVE:  Pt reports pain went back down again. Pain is when she straightens her knee. Noticed that she has one knee that is knock kneed. Trying to straighten when walking or standing the correct way. The swelling also went back down too.      OBJECTIVE: 21: ITB irritation due to limping       RESTRICTIONS/PRECAUTIONS:     Exercises/Interventions:     Therapeutic Exercises (34225) Resistance / level Sets/sec Reps Notes   Bike    Step one      Level 1      X 5 min  Pt states her knee loosens up as she rides   IB calf str  heel raise/ heel raise  Knee flexion stretch on step   HSS on step        Manual - pt could not reach ankle, educ to use belt/strap at home quad strengthening: Total gym with add ball squeeze  Mini squats   Single leg squats   1  120x  x15 B      VCs and TCs for correct knee position           [facility]:  Leg press  Leg ext  Ham curl  Hip abd   Hip add  Hip ext   TRX squats  TRX forward lunges  100#    20#  50#  35#  7.5#  1    1  1  1    1  1   x15    x15  x15  x15  x15  X15                                      Therapeutic Activities (82921)       HEP HO texted to pt and reviewed on her phone                                   Gait (74262)       Forward step downs  Lateral dips  4 inch  4 inch  1  1 x15 B  x15 B B UE support for all    amb with heel toe pattern, glute isos, and neutral knees, no recurvatum  x140 feetx2                 Neuromuscular Re-ed (30224)       BOSU balance with EO  BOSU balance with EC  One foot on BOSU, one foot on floor with weighted ball thor rot   BOSU lateral step overs          Light UE support    Finding neutral stance position                                 Manual Intervention (47161)       ITB \"the stick\" add      Flexion mobs grade 2, ext mobs grade 2, pat mobs all directions grade 2, PROM flexion and ext                                        Modalities:     8/19: IFC and CP to R knee in supine x 15 min   8/10, 8/3: ice to go   7/31: US 50% 1.6W/cm2 x8min 1MHz to R anterior and medial knee for pain and inflammation - pt reports relief afterwards    Pt. Education:  -patient educated on diagnosis, prognosis and expectations for rehab  -all patient questions were answered    Home Exercise Program:  Access Code: [email or web address]  URL: [email or web address]  Date: 07/14/2021  Prepared by: Brandie Stone     Exercises  Straight Leg Raise - 1 x daily - 7 x weekly - 1 sets - 10 reps  Sidelying Hip Abduction - 1 x daily - 7 x weekly - 1 sets - 10 reps  Prone Hip Extension - 1 x daily - 7 x weekly - 1 sets - 10 reps  Mini Squat - 1 x daily - 7 x weekly - 1 sets - 10 reps  Supine Quad Set - 1 x daily - 7 x weekly - 1 sets - 10 reps - 5 sec hold         Therapeutic Exercise and NMR EXR  [x] (51193) Provided verbal/tactile cueing for activities related to strengthening, flexibility, endurance, ROM for improvements in  [x] LE / Lumbar: LE, proximal hip, and core control with self care, mobility, lifting, ambulation. [] UE / Cervical: cervical, postural, scapular, scapulothoracic and UE control with self care, reaching, carrying, lifting, house/yardwork, driving, computer work.  [] (44933) Provided verbal/tactile cueing for activities related to improving balance, coordination, kinesthetic sense, posture, motor skill, proprioception to assist with   [] LE / lumbar: LE, proximal hip, and core control in self care, mobility, lifting, ambulation and eccentric single leg control. [] UE / cervical: cervical, scapular, scapulothoracic and UE control with self care, reaching, carrying, lifting, house/yardwork, driving, computer work.   [] (30635) Therapist is in constant attendance of 2 or more patients providing skilled therapy interventions, but not providing any significant amount of measurable one-on-one time to either patient, for improvements in  [] LE / lumbar: LE, proximal hip, and core control in self care, mobility, lifting, ambulation and eccentric single leg control. [] UE / cervical: cervical, scapular, scapulothoracic and UE control with self care, reaching, carrying, lifting, house/yardwork, driving, computer work.      NMR and Therapeutic Activities:    [] (34397 or 64958) Provided verbal/tactile cueing for activities related to improving balance, coordination, kinesthetic sense, posture, motor skill, proprioception and motor activation to allow for proper function of   [] LE: / Lumbar core, proximal hip and LE with self care and ADLs  [] UE / Cervical: cervical, postural, scapular, scapulothoracic and UE control with self care, carrying, lifting, driving, computer work.   [] (30158) Gait Re-education- Provided training and instruction to the patient for proper LE, core and proximal hip recruitment and positioning and eccentric body weight control with ambulation re-education including up and down stairs     Home Management Training / Self Care:  [] (40697) Provided self-care/home management training related to activities of daily living and compensatory training, and/or use of adaptive equipment for improvement with: ADLs and compensatory training, meal preparation, safety procedures and instruction in use of adaptive equipment, including bathing, grooming, dressing, personal hygiene, basic household cleaning and chores. Home Exercise Program:    [] (41123) Reviewed/Progressed HEP activities related to strengthening, flexibility, endurance, ROM of   [] LE / Lumbar: core, proximal hip and LE for functional self-care, mobility, lifting and ambulation/stair navigation   [] UE / Cervical: cervical, postural, scapular, scapulothoracic and UE control with self care, reaching, carrying, lifting, house/yardwork, driving, computer work  [] (45093)Reviewed/Progressed HEP activities related to improving balance, coordination, kinesthetic sense, posture, motor skill, proprioception of   [] LE: core, proximal hip and LE for self care, mobility, lifting, and ambulation/stair navigation    [] UE / Cervical: cervical, postural,  scapular, scapulothoracic and UE control with self care, reaching, carrying, lifting, house/yardwork, driving, computer work    Manual Treatments:  PROM / STM / Oscillations-Mobs:  G-I, II, III, IV (PA's, Inf., Post.)  [x] (93150) Provided manual therapy to mobilize LE, proximal hip and/or LS spine soft tissue/joints for the purpose of modulating pain, promoting relaxation,  increasing ROM, reducing/eliminating soft tissue swelling/inflammation/restriction, improving soft tissue extensibility and allowing for proper ROM for normal function with   [x] LE / lumbar: self care, mobility, lifting and ambulation.     [] UE / Cervical: self care, reaching, carrying, lifting, house/yardwork, driving, computer work. Modalities:  [] (51396) Vasopneumatic compression: Utilized vasopneumatic compression to decrease edema / swelling for the purpose of improving mobility and quad tone / recruitment which will allow for increased overall function including but not limited to self-care, transfers, ambulation, and ascending / descending stairs. Charges:  Timed Code Treatment Minutes: 32   Total Treatment Minutes: 47      [] EVAL - LOW (64828)   [] EVAL - MOD (53825)  [] EVAL - HIGH (43974)  [] RE-EVAL (54204)  [x] UX(86022) x  1     [] Ionto  [] NMR (28619) x       [] Vaso  [x] Manual (53699) x 1      [] Ultrasound  [] TA x        [] Mech Traction (35485)  [] Aquatic Therapy x      [x] ES (un) (27286): x 1  [] Home Management Training x  [] ES(attended) (41648)   [] Dry Needling 1-2 muscles (68796):  [] Dry Needling 3+ muscles (107175)  [] Group:      [] Other: gait x     GOALS:   Patient stated goal: return to using the knee regularly without pain     Therapist goals for Patient:   Short Term Goals: To be achieved in: 2 weeks  1. Independent in HEP and progression per patient tolerance, in order to prevent re-injury. 2. Patient will have a decrease in pain to facilitate improvement in movement, function, and ADLs as indicated by Functional Deficits.     Long Term Goals: To be achieved in: 6 weeks  1. Disability index score of 20% or less for the NDI to assist with reaching prior level of function. 2. Patient will demonstrate increased AROM to [facility] to allow for proper joint functioning as indicated by patients Functional Deficits. 3. Patient will demonstrate an increase in postural awareness and control to allow for proper functional mobility as indicated by patients Functional Deficits.    4. Patient will demonstrate an increase in Strength to at least  to allow for proper functional mobility as indicated by patients Functional Deficits. 5. Patient will return to functional activities including getting out of the car easily without increased symptoms or restriction. Overall Progression Towards Functional goals/ Treatment Progress Update:  [] Patient is progressing as expected towards functional goals listed. [] Progression is slowed due to complexities/Impairments listed. [] Progression has been slowed due to co-morbidities. [x] Plan just implemented, too soon to assess goals progression <30days   [] Goals require adjustment due to lack of progress  [] Patient is not progressing as expected and requires additional follow up with physician  [] Other    Persisting Functional Limitations/Impairments:  []Sleeping [x]Sitting               [x]Standing [x]Transfers        [x]Walking [x]Kneeling               [x]Stairs [x]Squatting / bending   [x]ADLs []Reaching  [x]Lifting  []Housework  []Driving [x]Job related tasks  []Sports/Recreation []Other:        ASSESSMENT:  Pt painful and swollen this date. Limited therex due to pain. Trialled estim at end of session - will montior response. Treatment/Activity Tolerance:  [x] Patient able to complete tx [] Patient limited by fatigue  [] Patient limited by pain  [] Patient limited by other medical complications  [] Other:     Prognosis: [x] Good [] Fair  [] Poor    Patient Requires Follow-up: [x] Yes  [] No    Plan for next treatment session:    PLAN: See zhang. PT 2x / week for 6 weeks. [x] Continue per plan of care [] Alter current plan (see comments)  [] Plan of care initiated [] Hold pending MD visit [] Discharge    Electronically signed by: Ami Christine PT, DPT    Note: If patient does not return for scheduled/ recommended follow up visits, this note will serve as a discharge from care along with most recent update on progress.

## 2021-08-30 ENCOUNTER — HOSPITAL ENCOUNTER (OUTPATIENT)
Dept: PHYSICAL THERAPY | Age: 36
Setting detail: THERAPIES SERIES
Discharge: HOME | End: 2021-08-30
Payer: COMMERCIAL

## 2021-08-30 PROCEDURE — 97110 THERAPEUTIC EXERCISES: CPT

## 2021-08-30 PROCEDURE — 97112 NEUROMUSCULAR REEDUCATION: CPT

## 2021-08-30 PROCEDURE — 97530 THERAPEUTIC ACTIVITIES: CPT

## 2021-08-30 NOTE — FLOWSHEET NOTE
[address] [facility] Physical Therapy  Phone: [phone]   Fax: [phone]    Physical Therapy Daily Treatment Note  Date:  2021    Patient Name:  Chapito Bailey    :  1985  MRN: [id number]  Medical/Treatment Diagnosis Information:  · Diagnosis: P27.968A (ICD-10-CM) - Traumatic medial retinacular tear of knee, right, sequela  · Treatment Diagnosis: LE weakness and edema, decreased gait  Insurance/Certification information:  PT Insurance Information: medical mutual  Physician Information:  Referring Practitioner: AMANDA Andersen  Plan of care signed (Y/N): []  Yes [x]  No     Date of Patient follow up with Physician:      Progress Report: []  Yes  [x]  No     Date Range for reporting period:  Beginnin21  Ending:     Progress report due (10 Rx/or 30 days whichever is less): visit #10 or      Recertification due (POC duration/ or 90 days whichever is less): visit #12 or 10/14/21     Visit # Insurance Allowable Auth required? Date Range     []  Yes  []  No        Latex Allergy:  [x]NO      []YES  Preferred Language for Healthcare:   [x]English       []other:    Functional Scale:        Date assessed:  LEFS: raw score = 20/80; dysfunction = 60-79%  21    Pain level:  2-3/10      SUBJECTIVE:  Pt reports pain started hurting mid shift (5-6) but now is okay. States she felt sore after the last visit but it was a good sore where she worked hard. OBJECTIVE: 21: ITB irritation due to limping       RESTRICTIONS/PRECAUTIONS:     Exercises/Interventions:     Therapeutic Exercises (04367) Resistance / level Sets/sec Reps Notes   Bike    Step one      Level 1      X 5 min  Pt states her knee loosens up as she rides   IB calf str  heel raise/ heel raise  Knee flexion stretch on step   HSS on step        Manual - pt could not reach ankle, educ to use belt/strap at home   quad strengthening:    Total gym with add ball squeeze  Mini squats   Single leg squats   1  120x  x15 B      VCs and TCs for correct knee position           [facility]:  Leg press  Leg ext  Ham curl  Hip abd   Hip add  Hip ext   TRX squats  TRX forward lunges  100#    20#  50#  35#  7.5#  1    1  1  1    1  1   x15    x15  x15  x15  x15  X15                                      Therapeutic Activities (67951)       HEP HO texted to pt and reviewed on her phone                                   Gait ([id number])       Forward step downs  Lateral dips  4 inch  4 inch  1  1 x15 B  x15 B B UE support for all    amb with heel toe pattern, glute isos, and neutral knees, no recurvatum  x2                 Neuromuscular Re-ed (95910)       BOSU balance with EO  BOSU balance with EC  One foot on BOSU, one foot on floor with weighted ball thor rot   BOSU lateral step overs          Light UE support    Finding neutral stance position     BOSU black side up  Mini squats    X 15    Squat walk with band  Monster walk Blue band  ''  X 2 laps  X 2 laps                  Manual Intervention (28875)       ITB \"the stick\" add      Flexion mobs grade 2, ext mobs grade 2, pat mobs all directions grade 2, PROM flexion and ext                                        Modalities:     8/19: IFC and CP to R knee in supine x 15 min   8/10, 8/3: ice to go   7/31: US 50% 1.6W/cm2 x8min 1MHz to R anterior and medial knee for pain and inflammation - pt reports relief afterwards    Pt. Education:  -patient educated on diagnosis, prognosis and expectations for rehab  -all patient questions were answered    Home Exercise Program:  Access Code: [email or web address]  URL: [email or web address]  Date: 07/14/2021  Prepared by: Emiliano Heller     Exercises  Straight Leg Raise - 1 x daily - 7 x weekly - 1 sets - 10 reps  Sidelying Hip Abduction - 1 x daily - 7 x weekly - 1 sets - 10 reps  Prone Hip Extension - 1 x daily - 7 x weekly - 1 sets - 10 reps  Mini Squat - 1 x daily - 7 x weekly - 1 sets - 10 reps  Supine Quad Set - 1 x daily - 7 x weekly - 1 sets - 10 reps - 5 sec hold         Therapeutic Exercise and NMR EXR  [x] (98964) Provided verbal/tactile cueing for activities related to strengthening, flexibility, endurance, ROM for improvements in  [x] LE / Lumbar: LE, proximal hip, and core control with self care, mobility, lifting, ambulation. [] UE / Cervical: cervical, postural, scapular, scapulothoracic and UE control with self care, reaching, carrying, lifting, house/yardwork, driving, computer work.  [] (15724) Provided verbal/tactile cueing for activities related to improving balance, coordination, kinesthetic sense, posture, motor skill, proprioception to assist with   [] LE / lumbar: LE, proximal hip, and core control in self care, mobility, lifting, ambulation and eccentric single leg control. [] UE / cervical: cervical, scapular, scapulothoracic and UE control with self care, reaching, carrying, lifting, house/yardwork, driving, computer work.   [] (99351) Therapist is in constant attendance of 2 or more patients providing skilled therapy interventions, but not providing any significant amount of measurable one-on-one time to either patient, for improvements in  [] LE / lumbar: LE, proximal hip, and core control in self care, mobility, lifting, ambulation and eccentric single leg control. [] UE / cervical: cervical, scapular, scapulothoracic and UE control with self care, reaching, carrying, lifting, house/yardwork, driving, computer work.      NMR and Therapeutic Activities:    [] (73905 or 73651) Provided verbal/tactile cueing for activities related to improving balance, coordination, kinesthetic sense, posture, motor skill, proprioception and motor activation to allow for proper function of   [] LE: / Lumbar core, proximal hip and LE with self care and ADLs  [] UE / Cervical: cervical, postural, scapular, scapulothoracic and UE control with self care, carrying, lifting, driving, computer work.   [] (13624) Gait Re-education- Provided training and instruction to the patient for proper LE, core and proximal hip recruitment and positioning and eccentric body weight control with ambulation re-education including up and down stairs     Home Management Training / Self Care:  [] (82054) Provided self-care/home management training related to activities of daily living and compensatory training, and/or use of adaptive equipment for improvement with: ADLs and compensatory training, meal preparation, safety procedures and instruction in use of adaptive equipment, including bathing, grooming, dressing, personal hygiene, basic household cleaning and chores. Home Exercise Program:    [] (38312) Reviewed/Progressed HEP activities related to strengthening, flexibility, endurance, ROM of   [] LE / Lumbar: core, proximal hip and LE for functional self-care, mobility, lifting and ambulation/stair navigation   [] UE / Cervical: cervical, postural, scapular, scapulothoracic and UE control with self care, reaching, carrying, lifting, house/yardwork, driving, computer work  [] (20495)Reviewed/Progressed HEP activities related to improving balance, coordination, kinesthetic sense, posture, motor skill, proprioception of   [] LE: core, proximal hip and LE for self care, mobility, lifting, and ambulation/stair navigation    [] UE / Cervical: cervical, postural,  scapular, scapulothoracic and UE control with self care, reaching, carrying, lifting, house/yardwork, driving, computer work    Manual Treatments:  PROM / STM / Oscillations-Mobs:  G-I, II, III, IV (PA's, Inf., Post.)  [x] (42342) Provided manual therapy to mobilize LE, proximal hip and/or LS spine soft tissue/joints for the purpose of modulating pain, promoting relaxation,  increasing ROM, reducing/eliminating soft tissue swelling/inflammation/restriction, improving soft tissue extensibility and allowing for proper ROM for normal function with   [x] LE / lumbar: self care, mobility, lifting and ambulation.     [] UE / Cervical: self care, reaching, carrying, lifting, house/yardwork, driving, computer work. Modalities:  [] (32121) Vasopneumatic compression: Utilized vasopneumatic compression to decrease edema / swelling for the purpose of improving mobility and quad tone / recruitment which will allow for increased overall function including but not limited to self-care, transfers, ambulation, and ascending / descending stairs. Charges:  Timed Code Treatment Minutes: 39   Total Treatment Minutes: 39      [] EVAL - LOW (96365)   [] EVAL - MOD (14940)  [] EVAL - HIGH (90952)  [] RE-EVAL (69811)  [x] NS(64311) x  1     [] Ionto  [x] NMR (03384) x       [] Vaso  [x] Manual (52382) x 1      [] Ultrasound  [] TA x        [] Mech Traction (05048)  [] Aquatic Therapy x      [x] ES (un) (42683): x 1  [] Home Management Training x  [] ES(attended) (33442)   [] Dry Needling 1-2 muscles (13635):  [] Dry Needling 3+ muscles (315971)  [] Group:      [] Other: gait x     GOALS:   Patient stated goal: return to using the knee regularly without pain     Therapist goals for Patient:   Short Term Goals: To be achieved in: 2 weeks  1. Independent in HEP and progression per patient tolerance, in order to prevent re-injury. 2. Patient will have a decrease in pain to facilitate improvement in movement, function, and ADLs as indicated by Functional Deficits.     Long Term Goals: To be achieved in: 6 weeks  1. Disability index score of 20% or less for the NDI to assist with reaching prior level of function. 2. Patient will demonstrate increased AROM to [facility] to allow for proper joint functioning as indicated by patients Functional Deficits. 3. Patient will demonstrate an increase in postural awareness and control to allow for proper functional mobility as indicated by patients Functional Deficits.    4. Patient will demonstrate an increase in Strength to at least  to allow for proper functional mobility as indicated by patients Functional Deficits. 5. Patient will return to functional activities including getting out of the car easily without increased symptoms or restriction. Overall Progression Towards Functional goals/ Treatment Progress Update:  [] Patient is progressing as expected towards functional goals listed. [] Progression is slowed due to complexities/Impairments listed. [] Progression has been slowed due to co-morbidities. [x] Plan just implemented, too soon to assess goals progression <30days   [] Goals require adjustment due to lack of progress  [] Patient is not progressing as expected and requires additional follow up with physician  [] Other    Persisting Functional Limitations/Impairments:  []Sleeping [x]Sitting               [x]Standing [x]Transfers        [x]Walking [x]Kneeling               [x]Stairs [x]Squatting / bending   [x]ADLs []Reaching  [x]Lifting  []Housework  []Driving [x]Job related tasks  []Sports/Recreation []Other:        ASSESSMENT:  Pt painful and swollen this date. Limited therex due to pain. Trialled estim at end of session - will montior response. Treatment/Activity Tolerance:  [x] Patient able to complete tx [] Patient limited by fatigue  [] Patient limited by pain  [] Patient limited by other medical complications  [] Other:     Prognosis: [x] Good [] Fair  [] Poor    Patient Requires Follow-up: [x] Yes  [] No    Plan for next treatment session:    PLAN: See zhang. PT 2x / week for 6 weeks. [x] Continue per plan of care [] Alter current plan (see comments)  [] Plan of care initiated [] Hold pending MD visit [] Discharge    Electronically signed by: Soumya Khan PT, DPT    Note: If patient does not return for scheduled/ recommended follow up visits, this note will serve as a discharge from care along with most recent update on progress.

## 2021-09-01 ENCOUNTER — HOSPITAL ENCOUNTER (OUTPATIENT)
Dept: PHYSICAL THERAPY | Age: 36
Setting detail: THERAPIES SERIES
Discharge: HOME | End: 2021-09-01
Payer: COMMERCIAL

## 2021-09-01 PROCEDURE — 97530 THERAPEUTIC ACTIVITIES: CPT

## 2021-09-01 PROCEDURE — 97112 NEUROMUSCULAR REEDUCATION: CPT

## 2021-09-01 PROCEDURE — 97110 THERAPEUTIC EXERCISES: CPT

## 2021-09-01 NOTE — FLOWSHEET NOTE
[address] [facility] Physical Therapy  Phone: [phone]   Fax: [phone]    Physical Therapy Daily Treatment Note  Date:  2021    Patient Name:  Nomi Varner    :  1985  MRN: [id number]  Medical/Treatment Diagnosis Information:  · Diagnosis: W69.118C (ICD-10-CM) - Traumatic medial retinacular tear of knee, right, sequela  · Treatment Diagnosis: LE weakness and edema, decreased gait  Insurance/Certification information:  PT Insurance Information: medical mutual  Physician Information:  Referring Practitioner: AMANDA Villareal  Plan of care signed (Y/N): []  Yes [x]  No     Date of Patient follow up with Physician:      Progress Report: []  Yes  [x]  No     Date Range for reporting period:  Beginnin21  Ending:     Progress report due (10 Rx/or 30 days whichever is less): visit #10 or      Recertification due (POC duration/ or 90 days whichever is less): visit #12 or 10/14/21     Visit # Insurance Allowable Auth required? Date Range     []  Yes  []  No        Latex Allergy:  [x]NO      []YES  Preferred Language for Healthcare:   [x]English       []other:    Functional Scale:        Date assessed:  LEFS: raw score = 20/80; dysfunction = 60-79%  21    Pain level:  2/10      SUBJECTIVE:  Pt reports feeling a twang every now and then and then it goes away. Feels it a couple of times a day. Able to do a bigger squat now. OBJECTIVE: 21: ITB irritation due to limping       RESTRICTIONS/PRECAUTIONS:     Exercises/Interventions:     Therapeutic Exercises (14728) Resistance / level Sets/sec Reps Notes   Bike    Step one      Level 1      X 5 min  Pt states her knee loosens up as she rides   IB calf str  heel raise/ heel raise  Knee flexion stretch on step   HSS on step        Manual - pt could not reach ankle, educ to use belt/strap at home   quad strengthening:    Total gym with add ball squeeze  Mini squats   With band  Single leg squats 1  1  20x  X 10  x15 B      VCs and TCs for correct knee position           [facility]:  Leg press  Leg ext  Ham curl  Hip abd   Hip add  Hip ext   TRX squats  TRX forward lunges  100#    20#  50#  35#  7.5#  1    1  1  1    1  1   x15    x15  x15  x15  x15  X15                                      Therapeutic Activities (12326)       HEP HO texted to pt and reviewed on her phone                                   Gait ([id number])       Forward step downs  Lateral dips  6 inch  6 inch  1  1 x15 B  x15 B B UE support for all    amb with heel toe pattern, glute isos, and neutral knees, no recurvatum  x2                 Neuromuscular Re-ed (55728)       BOSU balance with EO  BOSU balance with EC  One foot on BOSU, one foot on floor with weighted ball thor rot   BOSU lateral step overs          Light UE support    Finding neutral stance position     BOSU black side up  Mini squats   SLS    30'  X 15  X 3    Squat walk with band  Monster walk Blue band  ''  X 2 laps  X 2 laps                  Manual Intervention (04406)       ITB \"the stick\" add      Flexion mobs grade 2, ext mobs grade 2, pat mobs all directions grade 2, PROM flexion and ext                                        Modalities:     8/19: IFC and CP to R knee in supine x 15 min   8/10, 8/3: ice to go   7/31: US 50% 1.6W/cm2 x8min 1MHz to R anterior and medial knee for pain and inflammation - pt reports relief afterwards    Pt. Education:  -patient educated on diagnosis, prognosis and expectations for rehab  -all patient questions were answered    Home Exercise Program:  Access Code: [email or web address]  URL: [email or web address]  Date: 07/14/2021  Prepared by: Aldona Habermann     Exercises  Straight Leg Raise - 1 x daily - 7 x weekly - 1 sets - 10 reps  Sidelying Hip Abduction - 1 x daily - 7 x weekly - 1 sets - 10 reps  Prone Hip Extension - 1 x daily - 7 x weekly - 1 sets - 10 reps  Mini Squat - 1 x daily - 7 x weekly - 1 sets - 10 reps  Supine Quad Set - 1 x daily - 7 x weekly - 1 sets - 10 reps - 5 sec hold         Therapeutic Exercise and NMR EXR  [x] (66201) Provided verbal/tactile cueing for activities related to strengthening, flexibility, endurance, ROM for improvements in  [x] LE / Lumbar: LE, proximal hip, and core control with self care, mobility, lifting, ambulation. [] UE / Cervical: cervical, postural, scapular, scapulothoracic and UE control with self care, reaching, carrying, lifting, house/yardwork, driving, computer work.  [] (72717) Provided verbal/tactile cueing for activities related to improving balance, coordination, kinesthetic sense, posture, motor skill, proprioception to assist with   [] LE / lumbar: LE, proximal hip, and core control in self care, mobility, lifting, ambulation and eccentric single leg control. [] UE / cervical: cervical, scapular, scapulothoracic and UE control with self care, reaching, carrying, lifting, house/yardwork, driving, computer work.   [] (36622) Therapist is in constant attendance of 2 or more patients providing skilled therapy interventions, but not providing any significant amount of measurable one-on-one time to either patient, for improvements in  [] LE / lumbar: LE, proximal hip, and core control in self care, mobility, lifting, ambulation and eccentric single leg control. [] UE / cervical: cervical, scapular, scapulothoracic and UE control with self care, reaching, carrying, lifting, house/yardwork, driving, computer work.      NMR and Therapeutic Activities:    [] (13384 or 92650) Provided verbal/tactile cueing for activities related to improving balance, coordination, kinesthetic sense, posture, motor skill, proprioception and motor activation to allow for proper function of   [] LE: / Lumbar core, proximal hip and LE with self care and ADLs  [] UE / Cervical: cervical, postural, scapular, scapulothoracic and UE control with self care, carrying, lifting, driving, computer work.   [] (31770) Gait Re-education- Provided training and instruction to the patient for proper LE, core and proximal hip recruitment and positioning and eccentric body weight control with ambulation re-education including up and down stairs     Home Management Training / Self Care:  [] (56902) Provided self-care/home management training related to activities of daily living and compensatory training, and/or use of adaptive equipment for improvement with: ADLs and compensatory training, meal preparation, safety procedures and instruction in use of adaptive equipment, including bathing, grooming, dressing, personal hygiene, basic household cleaning and chores.      Home Exercise Program:    [] (74600) Reviewed/Progressed HEP activities related to strengthening, flexibility, endurance, ROM of   [] LE / Lumbar: core, proximal hip and LE for functional self-care, mobility, lifting and ambulation/stair navigation   [] UE / Cervical: cervical, postural, scapular, scapulothoracic and UE control with self care, reaching, carrying, lifting, house/yardwork, driving, computer work  [] (53668)Reviewed/Progressed HEP activities related to improving balance, coordination, kinesthetic sense, posture, motor skill, proprioception of   [] LE: core, proximal hip and LE for self care, mobility, lifting, and ambulation/stair navigation    [] UE / Cervical: cervical, postural,  scapular, scapulothoracic and UE control with self care, reaching, carrying, lifting, house/yardwork, driving, computer work    Manual Treatments:  PROM / STM / Oscillations-Mobs:  G-I, II, III, IV (PA's, Inf., Post.)  [x] (28040) Provided manual therapy to mobilize LE, proximal hip and/or LS spine soft tissue/joints for the purpose of modulating pain, promoting relaxation,  increasing ROM, reducing/eliminating soft tissue swelling/inflammation/restriction, improving soft tissue extensibility and allowing for proper ROM for normal function with   [x] LE / lumbar: self care, mobility, lifting and ambulation. [] UE / Cervical: self care, reaching, carrying, lifting, house/yardwork, driving, computer work. Modalities:  [] (52744) Vasopneumatic compression: Utilized vasopneumatic compression to decrease edema / swelling for the purpose of improving mobility and quad tone / recruitment which will allow for increased overall function including but not limited to self-care, transfers, ambulation, and ascending / descending stairs. Charges:  Timed Code Treatment Minutes: 40   Total Treatment Minutes: 40     [] EVAL - LOW (21203)   [] EVAL - MOD (19550)  [] EVAL - HIGH (41627)  [] RE-EVAL (84059)  [x] MB(41221) x  1     [] Ionto  [x] NMR (82960) x       [] Vaso  [] Manual (32436) x 1      [] Ultrasound  [x] TA x        [] Mech Traction (60215)  [] Aquatic Therapy x      [x] ES (un) (22087): x 1  [] Home Management Training x  [] ES(attended) (99555)   [] Dry Needling 1-2 muscles (43781):  [] Dry Needling 3+ muscles (807659)  [] Group:      [] Other: gait x     GOALS:   Patient stated goal: return to using the knee regularly without pain     Therapist goals for Patient:   Short Term Goals: To be achieved in: 2 weeks  1. Independent in HEP and progression per patient tolerance, in order to prevent re-injury. 2. Patient will have a decrease in pain to facilitate improvement in movement, function, and ADLs as indicated by Functional Deficits.     Long Term Goals: To be achieved in: 6 weeks  1. Disability index score of 20% or less for the NDI to assist with reaching prior level of function. 2. Patient will demonstrate increased AROM to [facility] to allow for proper joint functioning as indicated by patients Functional Deficits. 3. Patient will demonstrate an increase in postural awareness and control to allow for proper functional mobility as indicated by patients Functional Deficits.    4. Patient will demonstrate an increase in Strength to at least  to allow for proper functional mobility as indicated by patients Functional Deficits. 5. Patient will return to functional activities including getting out of the car easily without increased symptoms or restriction. Overall Progression Towards Functional goals/ Treatment Progress Update:  [] Patient is progressing as expected towards functional goals listed. [] Progression is slowed due to complexities/Impairments listed. [] Progression has been slowed due to co-morbidities. [x] Plan just implemented, too soon to assess goals progression <30days   [] Goals require adjustment due to lack of progress  [] Patient is not progressing as expected and requires additional follow up with physician  [] Other    Persisting Functional Limitations/Impairments:  []Sleeping [x]Sitting               [x]Standing [x]Transfers        [x]Walking [x]Kneeling               [x]Stairs [x]Squatting / bending   [x]ADLs []Reaching  [x]Lifting  []Housework  []Driving [x]Job related tasks  []Sports/Recreation []Other:        ASSESSMENT:  Pt performing all ex well,  Able to squat with decreased depth still but continuing to improve. No limp present, amb well. Will continue to strengthen quad and increase control    Treatment/Activity Tolerance:  [x] Patient able to complete tx [] Patient limited by fatigue  [] Patient limited by pain  [] Patient limited by other medical complications  [] Other:     Prognosis: [x] Good [] Fair  [] Poor    Patient Requires Follow-up: [x] Yes  [] No    Plan for next treatment session:    PLAN: See zhang. PT 2x / week for 6 weeks. [x] Continue per plan of care [] Alter current plan (see comments)  [] Plan of care initiated [] Hold pending MD visit [] Discharge    Electronically signed by: Ivan Mullen, PT, DPT    Note: If patient does not return for scheduled/ recommended follow up visits, this note will serve as a discharge from care along with most recent update on progress.

## 2021-12-16 ENCOUNTER — NURSE TRIAGE (OUTPATIENT)
Dept: OTHER | Facility: CLINIC | Age: 36
End: 2021-12-16

## 2021-12-16 NOTE — TELEPHONE ENCOUNTER
Brief description of triage: patient calling with right flank pain. See below assessment. Triage indicates for patient to see HCP today, patient does not have a PCP. Advised patient that she go to be evaluated in an UC or ER. Provided the patient with UCs covered by her insurance. Care advice provided, patient verbalizes understanding; denies any other questions or concerns; instructed to call back for any new or worsening symptoms. Attention Provider: Thank you for allowing me to participate in the care of your patient. The patient was connected to triage in response to symptoms provided. Please do not respond through this encounter as the response is not directed to a shared pool. Reason for Disposition   MODERATE pain (e.g., interferes with normal activities or awakens from sleep)    Answer Assessment - Initial Assessment Questions  1. LOCATION: \"Where does it hurt? \" (e.g., left, right)      Right side towards her lower back    2. ONSET: \"When did the pain start? \"      3 days    3. SEVERITY: \"How bad is the pain? \" (e.g., Scale 1-10; mild, moderate, or severe)    - MILD (1-3): doesn't interfere with normal activities     - MODERATE (4-7): interferes with normal activities or awakens from sleep     - SEVERE (8-10): excruciating pain and patient unable to do normal activities (stays in bed)        In tears last night, just took ibuprofen and is now 5-6/10    4. PATTERN: \"Does the pain come and go, or is it constant? \"       Come and goes, constant today    5. CAUSE: \"What do you think is causing the pain? \"      Unsure    6. OTHER SYMPTOMS:  \"Do you have any other symptoms? \" (e.g., fever, abdominal pain, vomiting, leg weakness, burning with urination, blood in urine)      Decreased appetite, weak, belching and nauseated, generalized weakness    7. PREGNANCY:  \"Is there any chance you are pregnant? \" \"When was your last menstrual period? \"      Denies, on birth control    Protocols used:  FLANK PAIN-ADULT-[place]